# Patient Record
Sex: MALE | Race: WHITE | Employment: FULL TIME | URBAN - METROPOLITAN AREA
[De-identification: names, ages, dates, MRNs, and addresses within clinical notes are randomized per-mention and may not be internally consistent; named-entity substitution may affect disease eponyms.]

---

## 2019-04-30 ENCOUNTER — HOSPITAL ENCOUNTER (EMERGENCY)
Age: 19
Discharge: ANOTHER HOSPITAL | End: 2019-04-30
Attending: EMERGENCY MEDICINE
Payer: COMMERCIAL

## 2019-04-30 VITALS
HEART RATE: 61 BPM | TEMPERATURE: 97.6 F | BODY MASS INDEX: 22.9 KG/M2 | SYSTOLIC BLOOD PRESSURE: 130 MMHG | RESPIRATION RATE: 16 BRPM | WEIGHT: 160 LBS | DIASTOLIC BLOOD PRESSURE: 68 MMHG | HEIGHT: 70 IN | OXYGEN SATURATION: 95 %

## 2019-04-30 DIAGNOSIS — T50.901A ACCIDENTAL DRUG OVERDOSE, INITIAL ENCOUNTER: Primary | ICD-10-CM

## 2019-04-30 PROCEDURE — 99284 EMERGENCY DEPT VISIT MOD MDM: CPT

## 2019-05-01 NOTE — ED NOTES
Bed: ED-25  Expected date:   Expected time:   Means of arrival:   Comments:  [facility] 1- 19 M overdose from [address].  Angela Mancuso  04/30/19 0483

## 2019-05-01 NOTE — ED NOTES
Pt denies any drug use and states he has history of seizures . Pt is calm and cooperative .      Jak Cardoso RN  04/30/19 2410
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
## 2019-05-02 ENCOUNTER — HOSPITAL ENCOUNTER (EMERGENCY)
Age: 19
Discharge: HOME | End: 2019-05-02
Attending: EMERGENCY MEDICINE
Payer: COMMERCIAL

## 2019-05-02 VITALS
RESPIRATION RATE: 18 BRPM | TEMPERATURE: 98.4 F | SYSTOLIC BLOOD PRESSURE: 134 MMHG | BODY MASS INDEX: 22.4 KG/M2 | HEIGHT: 71 IN | OXYGEN SATURATION: 97 % | WEIGHT: 160 LBS | HEART RATE: 78 BPM | DIASTOLIC BLOOD PRESSURE: 84 MMHG

## 2019-05-02 DIAGNOSIS — F48.9 MENTAL HEALTH PROBLEM: Primary | ICD-10-CM

## 2019-05-02 PROCEDURE — 99282 EMERGENCY DEPT VISIT SF MDM: CPT

## 2019-05-02 ASSESSMENT — ENCOUNTER SYMPTOMS
ALLERGIC/IMMUNOLOGIC NEGATIVE: 1
RESPIRATORY NEGATIVE: 1
EYES NEGATIVE: 1
GASTROINTESTINAL NEGATIVE: 1

## 2019-05-02 NOTE — ED NOTES
Discharge instructions reviewed. Pt verbalized understanding.        Cynthia Antunez RN  05/02/19 6040
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
## 2019-09-30 ENCOUNTER — HOSPITAL ENCOUNTER (EMERGENCY)
Age: 19
Discharge: LEFT BEFORE DISCHARGE | End: 2019-09-30
Attending: EMERGENCY MEDICINE
Payer: MEDICAID

## 2019-09-30 DIAGNOSIS — R56.9 SEIZURE (HCC): Primary | ICD-10-CM

## 2019-09-30 PROCEDURE — 99284 EMERGENCY DEPT VISIT MOD MDM: CPT

## 2019-09-30 RX ORDER — 0.9 % SODIUM CHLORIDE 0.9 %
1000 INTRAVENOUS SOLUTION INTRAVENOUS ONCE
Status: DISCONTINUED | OUTPATIENT
Start: 2019-09-30 | End: 2019-09-30 | Stop reason: HOSPADM

## 2021-04-30 ENCOUNTER — HOSPITAL ENCOUNTER (EMERGENCY)
Age: 21
Discharge: HOME | End: 2021-04-30
Attending: EMERGENCY MEDICINE
Payer: COMMERCIAL

## 2021-04-30 ENCOUNTER — APPOINTMENT (OUTPATIENT)
Dept: CT IMAGING | Age: 21
End: 2021-04-30
Payer: COMMERCIAL

## 2021-04-30 VITALS
OXYGEN SATURATION: 100 % | DIASTOLIC BLOOD PRESSURE: 63 MMHG | RESPIRATION RATE: 16 BRPM | HEART RATE: 80 BPM | TEMPERATURE: 98 F | WEIGHT: 160 LBS | BODY MASS INDEX: 22.4 KG/M2 | HEIGHT: 71 IN | SYSTOLIC BLOOD PRESSURE: 126 MMHG

## 2021-04-30 DIAGNOSIS — G40.919 BREAKTHROUGH SEIZURE (HCC): Primary | ICD-10-CM

## 2021-04-30 DIAGNOSIS — S01.81XA FOREHEAD LACERATION, INITIAL ENCOUNTER: ICD-10-CM

## 2021-04-30 DIAGNOSIS — S16.1XXA ACUTE STRAIN OF NECK MUSCLE, INITIAL ENCOUNTER: ICD-10-CM

## 2021-04-30 DIAGNOSIS — Z91.14 NONCOMPLIANCE WITH MEDICATIONS: ICD-10-CM

## 2021-04-30 DIAGNOSIS — S01.411A CHEEK LACERATION, RIGHT, INITIAL ENCOUNTER: ICD-10-CM

## 2021-04-30 DIAGNOSIS — S13.9XXA NECK SPRAIN, INITIAL ENCOUNTER: ICD-10-CM

## 2021-04-30 DIAGNOSIS — G40.909 SEIZURE DISORDER (HCC): ICD-10-CM

## 2021-04-30 DIAGNOSIS — S09.90XA CLOSED HEAD INJURY, INITIAL ENCOUNTER: ICD-10-CM

## 2021-04-30 LAB
ALBUMIN SERPL-MCNC: 4.2 GM/DL (ref 3.4–5)
ALCOHOL SCREEN SERUM: <0.01 %WT/VOL
ALP BLD-CCNC: 71 IU/L (ref 40–129)
ALT SERPL-CCNC: 13 U/L (ref 10–40)
AMPHETAMINES: NEGATIVE
ANION GAP SERPL CALCULATED.3IONS-SCNC: 7 MMOL/L (ref 4–16)
AST SERPL-CCNC: 19 IU/L (ref 15–37)
BACTERIA: NEGATIVE /HPF
BARBITURATE SCREEN URINE: NEGATIVE
BASOPHILS ABSOLUTE: 0.1 K/CU MM
BASOPHILS RELATIVE PERCENT: 0.6 % (ref 0–1)
BENZODIAZEPINE SCREEN, URINE: NEGATIVE
BILIRUB SERPL-MCNC: 0.3 MG/DL (ref 0–1)
BILIRUBIN URINE: NEGATIVE MG/DL
BLOOD, URINE: NEGATIVE
BUN BLDV-MCNC: 15 MG/DL (ref 6–23)
CALCIUM SERPL-MCNC: 9.2 MG/DL (ref 8.3–10.6)
CANNABINOID SCREEN URINE: ABNORMAL
CHLORIDE BLD-SCNC: 102 MMOL/L (ref 99–110)
CLARITY: CLEAR
CO2: 25 MMOL/L (ref 21–32)
COCAINE METABOLITE: ABNORMAL
COLOR: YELLOW
CREAT SERPL-MCNC: 1 MG/DL (ref 0.9–1.3)
DIFFERENTIAL TYPE: ABNORMAL
EOSINOPHILS ABSOLUTE: 0.1 K/CU MM
EOSINOPHILS RELATIVE PERCENT: 1 % (ref 0–3)
GFR AFRICAN AMERICAN: >60 ML/MIN/1.73M2
GFR NON-AFRICAN AMERICAN: >60 ML/MIN/1.73M2
GLUCOSE BLD-MCNC: 126 MG/DL (ref 70–99)
GLUCOSE, URINE: NEGATIVE MG/DL
HCT VFR BLD CALC: 44.7 % (ref 42–52)
HEMOGLOBIN: 15.6 GM/DL (ref 13.5–18)
IMMATURE NEUTROPHIL %: 0.5 % (ref 0–0.43)
KETONES, URINE: NEGATIVE MG/DL
LEUKOCYTE ESTERASE, URINE: NEGATIVE
LYMPHOCYTES ABSOLUTE: 1.9 K/CU MM
LYMPHOCYTES RELATIVE PERCENT: 13.3 % (ref 24–44)
MAGNESIUM: 2.2 MG/DL (ref 1.8–2.4)
MCH RBC QN AUTO: 33.2 PG (ref 27–31)
MCHC RBC AUTO-ENTMCNC: 34.9 % (ref 32–36)
MCV RBC AUTO: 95.1 FL (ref 78–100)
MONOCYTES ABSOLUTE: 0.8 K/CU MM
MONOCYTES RELATIVE PERCENT: 5.4 % (ref 0–4)
MUCUS: ABNORMAL HPF
NITRITE URINE, QUANTITATIVE: NEGATIVE
NON SQUAM EPI CELLS: <1 /HPF
NUCLEATED RBC %: 0 %
OPIATES, URINE: NEGATIVE
OXYCODONE: NEGATIVE
PDW BLD-RTO: 11.8 % (ref 11.7–14.9)
PH, URINE: 6 (ref 5–8)
PHENCYCLIDINE, URINE: NEGATIVE
PLATELET # BLD: 243 K/CU MM (ref 140–440)
PMV BLD AUTO: 8.5 FL (ref 7.5–11.1)
POTASSIUM SERPL-SCNC: 4.1 MMOL/L (ref 3.5–5.1)
PROTEIN UA: 30 MG/DL
RBC # BLD: 4.7 M/CU MM (ref 4.6–6.2)
RBC URINE: 1 /HPF (ref 0–3)
SEGMENTED NEUTROPHILS ABSOLUTE COUNT: 11.4 K/CU MM
SEGMENTED NEUTROPHILS RELATIVE PERCENT: 79.2 % (ref 36–66)
SODIUM BLD-SCNC: 134 MMOL/L (ref 135–145)
SPECIFIC GRAVITY UA: 1.03 (ref 1–1.03)
SQUAMOUS EPITHELIAL: <1 /HPF
TOTAL IMMATURE NEUTOROPHIL: 0.07 K/CU MM
TOTAL NUCLEATED RBC: 0 K/CU MM
TOTAL PROTEIN: 6.8 GM/DL (ref 6.4–8.2)
TRICHOMONAS: ABNORMAL /HPF
UNCLASSIFIED CAST: 3 /LPF
UROBILINOGEN, URINE: NEGATIVE MG/DL (ref 0.2–1)
WBC # BLD: 14.3 K/CU MM (ref 4–10.5)
WBC UA: 1 /HPF (ref 0–2)

## 2021-04-30 PROCEDURE — 85025 COMPLETE CBC W/AUTO DIFF WBC: CPT

## 2021-04-30 PROCEDURE — 96365 THER/PROPH/DIAG IV INF INIT: CPT

## 2021-04-30 PROCEDURE — 83735 ASSAY OF MAGNESIUM: CPT

## 2021-04-30 PROCEDURE — 80177 DRUG SCRN QUAN LEVETIRACETAM: CPT

## 2021-04-30 PROCEDURE — 12013 RPR F/E/E/N/L/M 2.6-5.0 CM: CPT

## 2021-04-30 PROCEDURE — 70486 CT MAXILLOFACIAL W/O DYE: CPT

## 2021-04-30 PROCEDURE — 80053 COMPREHEN METABOLIC PANEL: CPT

## 2021-04-30 PROCEDURE — 81001 URINALYSIS AUTO W/SCOPE: CPT

## 2021-04-30 PROCEDURE — 72125 CT NECK SPINE W/O DYE: CPT

## 2021-04-30 PROCEDURE — 70450 CT HEAD/BRAIN W/O DYE: CPT

## 2021-04-30 PROCEDURE — 2500000003 HC RX 250 WO HCPCS: Performed by: PHYSICIAN ASSISTANT

## 2021-04-30 PROCEDURE — 80307 DRUG TEST PRSMV CHEM ANLYZR: CPT

## 2021-04-30 PROCEDURE — 2580000003 HC RX 258: Performed by: EMERGENCY MEDICINE

## 2021-04-30 PROCEDURE — G0480 DRUG TEST DEF 1-7 CLASSES: HCPCS

## 2021-04-30 PROCEDURE — 6370000000 HC RX 637 (ALT 250 FOR IP): Performed by: EMERGENCY MEDICINE

## 2021-04-30 PROCEDURE — 6360000002 HC RX W HCPCS: Performed by: EMERGENCY MEDICINE

## 2021-04-30 PROCEDURE — 6370000000 HC RX 637 (ALT 250 FOR IP): Performed by: PHYSICIAN ASSISTANT

## 2021-04-30 PROCEDURE — 99285 EMERGENCY DEPT VISIT HI MDM: CPT

## 2021-04-30 RX ORDER — LEVETIRACETAM 500 MG/1
500 TABLET ORAL 2 TIMES DAILY
COMMUNITY
End: 2021-04-30 | Stop reason: SDUPTHER

## 2021-04-30 RX ORDER — LACOSAMIDE 150 MG/1
150 TABLET ORAL 2 TIMES DAILY
COMMUNITY
Start: 2021-02-20 | End: 2021-04-30 | Stop reason: SDUPTHER

## 2021-04-30 RX ORDER — LACOSAMIDE 50 MG/1
150 TABLET ORAL 2 TIMES DAILY
Status: DISCONTINUED | OUTPATIENT
Start: 2021-04-30 | End: 2021-04-30 | Stop reason: HOSPADM

## 2021-04-30 RX ORDER — LEVETIRACETAM 500 MG/1
500 TABLET ORAL 2 TIMES DAILY
Qty: 60 TABLET | Refills: 0 | Status: SHIPPED | OUTPATIENT
Start: 2021-04-30 | End: 2022-03-11

## 2021-04-30 RX ORDER — DIAPER,BRIEF,INFANT-TODD,DISP
EACH MISCELLANEOUS ONCE
Status: COMPLETED | OUTPATIENT
Start: 2021-04-30 | End: 2021-04-30

## 2021-04-30 RX ORDER — LACOSAMIDE 150 MG/1
150 TABLET ORAL 2 TIMES DAILY
Qty: 60 TABLET | Refills: 0 | Status: SHIPPED | OUTPATIENT
Start: 2021-04-30 | End: 2021-07-23 | Stop reason: ALTCHOICE

## 2021-04-30 RX ORDER — BUPIVACAINE HYDROCHLORIDE 5 MG/ML
10 INJECTION, SOLUTION EPIDURAL; INTRACAUDAL ONCE
Status: COMPLETED | OUTPATIENT
Start: 2021-04-30 | End: 2021-04-30

## 2021-04-30 RX ADMIN — LEVETIRACETAM 1000 MG: 100 INJECTION, SOLUTION INTRAVENOUS at 08:03

## 2021-04-30 RX ADMIN — Medication 3 ML: at 08:03

## 2021-04-30 RX ADMIN — BUPIVACAINE HYDROCHLORIDE 50 MG: 5 INJECTION, SOLUTION EPIDURAL; INTRACAUDAL at 08:04

## 2021-04-30 RX ADMIN — LACOSAMIDE 150 MG: 50 TABLET, FILM COATED ORAL at 07:50

## 2021-04-30 RX ADMIN — BACITRACIN ZINC: 500 OINTMENT TOPICAL at 09:37

## 2021-04-30 NOTE — ED PROVIDER NOTES
Emergency Department Encounter    Patient: Taurus Hewitt  MRN: [id number]  : 2000  Date of Evaluation: 2021  ED Provider:  NAGA ROMERO      Triage Chief Complaint:   Seizures      [place]:  Taurus Hewitt is a 23 y.o. male that presents to the emergency department with fall and possible seizure. Patient reports a longstanding history of seizures for which he takes Keppra and Vimpat. Patient reports that he may have missed 2 or 3 days worth of dosages, and he is known to have seizures when he misses as little as 1 days worth. Patient recalls playing video games and then has some memory of \"freaking out\" banging on his neighbors door, and walking around the neighborhood naked. Patient reports bleeding and pain from lacerations on the forehead and right cheek. He reports some aching quality neck pain. He denies any associated weakness, numbness, or tingling. He denies any recent chest pain or discomfort, productive cough, fevers, urinary symptoms, alcohol, or other drugs. Patient sees Dr Lynder Mohs in [place] for his neurology care, but he has missed several appointments. Patient is running low on his antiepileptics, which is also contributed to his missed dosages. Patient reports that his tetanus was last updated in 2018 after he stepped on a nail. Patient reports no other particular provocative or alleviating factors. ROS - see HPI, below listed is current ROS at time of my eval:  CONSTITUTIONAL: No fevers, chills, or sweats. EYES: No vision change, redness, drainage, or discharge. HENT: No sore throat, runny nose, or earache. No dental pain. No painful swallowing. RESPIRATORY: No difficulty breathing, cough, or sputum production. CARDIOVASCULAR: No anginal-type chest pain, orthopnea, or edema. GASTROINTESTINAL: No nausea, vomiting, or abdominal pain. No diarrhea or constipation. No hematemesis, hematochezia, or melena. GENITOURINARY: No frequency, urgency, or dysuria.   No hematuria. MUSCULOSKELETAL: As above. NEUROLOGICAL: No focal weakness, numbness, or tingling. SKIN: No rashes or other lesions reported. No yellowing of the skin. Past Medical History:   Diagnosis Date    Depression     Seizures ([facility] [address]     Suicidal intent      No past surgical history on file. No family history on file. Social History     Socioeconomic History    Marital status: Single     Spouse name: Not on file    Number of children: Not on file    Years of education: Not on file    Highest education level: Not on file   Occupational History    Not on file   Social Needs    Financial resource strain: Not on file    Food insecurity     Worry: Not on file     Inability: Not on file    Transportation needs     Medical: Not on file     Non-medical: Not on file   Tobacco Use    Smoking status: Current Some Day Smoker    Smokeless tobacco: Never Used   Substance and Sexual Activity    Alcohol use:  Yes    Drug use: Never     Types: Marijuana    Sexual activity: Not on file   Lifestyle    Physical activity     Days per week: Not on file     Minutes per session: Not on file    Stress: Not on file   Relationships    Social connections     Talks on phone: Not on file     Gets together: Not on file     Attends Spiritism service: Not on file     Active member of club or organization: Not on file     Attends meetings of clubs or organizations: Not on file     Relationship status: Not on file    Intimate partner violence     Fear of current or ex partner: Not on file     Emotionally abused: Not on file     Physically abused: Not on file     Forced sexual activity: Not on file   Other Topics Concern    Not on file   Social History Narrative    ** Merged History Encounter **          Current Facility-Administered Medications   Medication Dose Route Frequency Provider Last Rate Last Admin    lacosamide (VIMPAT) tablet 150 mg  150 mg Oral BID Cassy Corbin MD   150 mg at 04/30/21 0750     Current Outpatient Medications   Medication Sig Dispense Refill    lacosamide (VIMPAT) 150 MG TABS tablet Take 1 tablet by mouth 2 times daily for 30 days. 60 tablet 0    levETIRAcetam (KEPPRA) 500 MG tablet Take 1 tablet by mouth 2 times daily 60 tablet 0     Allergies   Allergen Reactions    Oxycodone        Nursing Notes Reviewed    Physical Exam:  Triage VS:    ED Triage Vitals [04/30/21 0715]   Enc Vitals Group      /85      Pulse 76      Resp 21      Temp 97.6 °F (36.4 °C)      Temp Source Oral      SpO2 100 %      Weight 160 lb (72.6 kg)      Height 5' 11\" (1.803 m)      Head Circumference       Peak Flow       Pain Score       Pain Loc       Pain Edu? Excl. in [address]? My pulse ox interpretation is -normal on room air    GENERAL: Patient is awake, alert, and oriented appropriately. Patient is resting comfortably in a still position on the exam table. Patient speaking in full and complete sentences. Well-nourished and well-developed. HEENT: Approximate 1 cm nongaping vertical laceration in the mid forehead. No associated hematoma, step-off, or deformity. Approximate 3 cm laceration in the mid right cheek. Visible subcutaneous tissues without muscular or bony involvement detectable. No step-off or crepitus. Otherwise, no midface, orbital ridge, zygomatic, maxillary, or mandibular tenderness. No dental malocclusion. Frenulum is intact. Pupils equal, round, and reactive to light. No hyphema. No redness or matting. Bilateral external ears are unremarkable. Tympanic membranes are pearly and gray without visible effusion or retraction. Nasal mucosa is pink without purulence. Oral mucosa is moist and pink. There is no significant tonsillar enlargement or exudate. Uvula midline. There is no elevation of the tongue or pooling of secretions. NECK: Supple with normal range of motion. No Kernig's or Brudzinski signs. No visible JVD.   No significant lymphadenopathy or limitation range of motion. Mild midline and bilateral tenderness of the low cervical spine, approximately C4-T1. No associated crepitus, step-off, or deformity. RESPIRATORY: Symmetric aeration bilaterally. No audible wheezes, rales, rhonchi, or stridor. No chest wall tenderness. CARDIOVASCULAR: Regular rate and rhythm. No audible murmurs, rubs, or gallops. No central or peripheral cyanosis. GASTROINTESTINAL: Soft, nontender, and nondistended. No McBurney's or Hawkins's point tenderness. No guarding, rebound, rigidity. No mass or pulsatile mass. Bowel sounds are present in all quadrants. No costovertebral angle tenderness. NEUROLOGICAL: Awake, alert and oriented x 3. Cranial nerves III through XII are grossly intact as tested without facial droop or dermatomal paresthesias. Of note, forehead wrinkles are symmetric and intact. Conjugate gaze without entrapment. No asymmetry of the corners of the mouth or nasolabial folds. No gross motor or cerebellar deficits. Motor exhibits 5/5 strength in the bilateral trapezius, biceps, triceps, handgrip, quadriceps, psoas, dorsiflexors, and plantar flexors. No gross dermatomal paresthesias. No gross deficits of the cerebellum. MUSCULOSKELETAL: No asymmetric edema, Homans' sign, or cords. No tenderness or limitation range of motion to the bilateral shoulders, elbows, wrists, hips, knees, or ankles. No accompanying long bone tenderness or deformity. SKIN: Normal tone for ethnicity. Normal turgor and brisk capillary refill peripherally. No petechiae, purpura, vesicles, bullae, or other lesions. No icterus. PSYCHIATRIC: Normal mood. Normal affect. No voiced suicidal or homicidal ideation. Patient does not respond to internal stimuli. Emergency department course. Patient is brought to bed trauma-4 and assessed and reassessed by me.   Prior to initial evaluation, orders are placed for medical screening studies including CBC, metabolic panel, urinalysis and drug screen, and levels for Vimpat and levetiracetam.  After initial evaluation, orders are placed for CT scans of the head, maxillofacial structures, and cervical spine. Patient will be given levetiracetam 1 g IV to supplement his levels. Routine dosage of Vimpat 150 mg is provided, as well. Tetanus is appropriately up-to-date. Wound care will be provided by MAXIM Stout PA-C. Patient is agreeable to continuing plan. Upon most recent reevaluation, patient has been resting comfortably. There has been no witnessed seizure activity. There has been no indication of status epilepticus or an inappropriate postictal phase. Medical screening studies are generally reassuring. Antiepileptic levels are pending, but additional dosages have been provided in the emergency department as a precaution. CT imaging shows no apparent fracture, intracranial hemorrhage, mass-effect, or other acute change. There is some nonspecific and likely chronic sinus inflammation. He has no acute symptoms to suggest need for antibiotics or in-hospital management. Patient reports that he is uncertain if his existing neurologist will still see him as he did miss an appointment or 2. He is encouraged to call their office in the hopes that he can still maintain care. Information for to local neurologist is provided as additional resources. Prescriptions for the next month of his 2 antiepileptics is provided, as well. Head injury and wound care instructions are provided. Seizure precautions are provided. We have discussed all available results. Patient is satisfied with evaluation and agreeable to recommendations. Patient has had the opportunity to ask questions, and they have been answered to the best of my ability. Instructions are given to follow-up with primary care provider for reevaluation and further testing. Very strict return and follow-up instructions are provided.       Patient seen during [facility] I did <0.01 <0.01 %WT/VOL   Urinalysis, reflex to microscopic   Result Value Ref Range    Color, UA YELLOW YELLOW    Clarity, UA CLEAR CLEAR    Glucose, Urine NEGATIVE NEGATIVE MG/DL    Bilirubin Urine NEGATIVE NEGATIVE MG/DL    Ketones, Urine NEGATIVE NEGATIVE MG/DL    Specific Gravity, UA 1.026 1.001 - 1.035    Blood, Urine NEGATIVE NEGATIVE    pH, Urine 6.0 5.0 - 8.0    Protein, UA 30 (A) NEGATIVE MG/DL    Urobilinogen, Urine NEGATIVE 0.2 - 1.0 MG/DL    Nitrite Urine, Quantitative NEGATIVE NEGATIVE    Leukocyte Esterase, Urine NEGATIVE NEGATIVE    RBC, UA 1 0 - 3 /HPF    WBC, UA 1 0 - 2 /HPF    Bacteria, UA NEGATIVE NEGATIVE /HPF    Squam Epithel, UA <1 /HPF    Mucus, UA RARE (A) NEGATIVE HPF    Trichomonas, UA NONE SEEN NONE SEEN /HPF    non squam epi cells <1 /HPF    Unclassified Cast 3 /LPF   Drug screen multi urine   Result Value Ref Range    Cannabinoid Scrn, Ur UNCONFIRMED POSITIVE (A) NEGATIVE    Amphetamines NEGATIVE NEGATIVE    Cocaine Metabolite UNCONFIRMED POSITIVE (A) NEGATIVE    Benzodiazepine Screen, Urine NEGATIVE NEGATIVE    Barbiturate Screen, Ur NEGATIVE NEGATIVE    Opiates, Urine NEGATIVE NEGATIVE    Phencyclidine, Urine NEGATIVE NEGATIVE    Oxycodone NEGATIVE NEGATIVE        Radiographs (if obtained):  Radiologist's Report Reviewed:  Ct Head Wo Contrast    Result Date: 4/30/2021  EXAMINATION: CT OF THE HEAD WITHOUT CONTRAST; CT OF THE CERVICAL SPINE WITHOUT CONTRAST; CT OF THE FACE WITHOUT CONTRAST  4/30/2021 8:13 am TECHNIQUE: CT of the head was performed without the administration of intravenous contrast. Dose modulation, iterative reconstruction, and/or weight based adjustment of the mA/kV was utilized to reduce the radiation dose to as low as reasonably achievable.; CT of the cervical spine was performed without the administration of intravenous contrast. Multiplanar reformatted images are provided for review.  Dose modulation, iterative reconstruction, and/or weight based adjustment of the mA/kV was utilized to reduce the radiation dose to as low as reasonably achievable.; CT of the face was performed without the administration of intravenous contrast. Multiplanar reformatted images are provided for review. Dose modulation, iterative reconstruction, and/or weight based adjustment of the mA/kV was utilized to reduce the radiation dose to as low as reasonably achievable. COMPARISON: None. HISTORY: ORDERING SYSTEM PROVIDED HISTORY: Seizure, head injury TECHNOLOGIST PROVIDED HISTORY: Reason for exam:->Seizure, head injury Has a \"code stroke\" or \"stroke alert\" been called? ->No Decision Support Exception - unselect if not a suspected or confirmed emergency medical condition->Emergency Medical Condition (MA) Reason for Exam: Seizure, head injury Acuity: Acute Type of Exam: Initial FINDINGS: BRAIN/VENTRICLES: There is no acute intracranial hemorrhage, mass effect or midline shift. No abnormal extra-axial fluid collection. The gray-white differentiation is maintained without evidence of an acute infarct. There is no evidence of hydrocephalus. SOFT TISSUES/SKULL:  No acute abnormality of the visualized skull or soft tissues. CT cervical spine: No acute fracture dislocation or focal soft tissue abnormality is seen. CT scan of the maxillofacial bones: The nasal bones and zygomatic arches are intact. The mandible is normally located and intact. The orbital floors are intact. The paranasal sinuses demonstrate bilateral maxillary sinus disease and mucosal thickening in the ethmoid air cells. There is a large gaby bullosa in the right middle nasal turbinates with debris and mucosal thickening. There is a small gaby bullosa in the left middle nasal turbinates. There are soft tissue abrasion over the right cheek. No acute intracranial abnormality. No acute osseous abnormality in the cervical spine.  Bilateral sinus disease with bilateral gaby bullosa with a large gaby bullosa in the right middle nasal turbinates containing debris and mucosal thickening. No acute osseous abnormality in the maxillofacial bones     Ct Cervical Spine Wo Contrast    Result Date: 4/30/2021  EXAMINATION: CT OF THE HEAD WITHOUT CONTRAST; CT OF THE CERVICAL SPINE WITHOUT CONTRAST; CT OF THE FACE WITHOUT CONTRAST  4/30/2021 8:13 am TECHNIQUE: CT of the head was performed without the administration of intravenous contrast. Dose modulation, iterative reconstruction, and/or weight based adjustment of the mA/kV was utilized to reduce the radiation dose to as low as reasonably achievable.; CT of the cervical spine was performed without the administration of intravenous contrast. Multiplanar reformatted images are provided for review. Dose modulation, iterative reconstruction, and/or weight based adjustment of the mA/kV was utilized to reduce the radiation dose to as low as reasonably achievable.; CT of the face was performed without the administration of intravenous contrast. Multiplanar reformatted images are provided for review. Dose modulation, iterative reconstruction, and/or weight based adjustment of the mA/kV was utilized to reduce the radiation dose to as low as reasonably achievable. COMPARISON: None. HISTORY: ORDERING SYSTEM PROVIDED HISTORY: Seizure, head injury TECHNOLOGIST PROVIDED HISTORY: Reason for exam:->Seizure, head injury Has a \"code stroke\" or \"stroke alert\" been called? ->No Decision Support Exception - unselect if not a suspected or confirmed emergency medical condition->Emergency Medical Condition (MA) Reason for Exam: Seizure, head injury Acuity: Acute Type of Exam: Initial FINDINGS: BRAIN/VENTRICLES: There is no acute intracranial hemorrhage, mass effect or midline shift. No abnormal extra-axial fluid collection. The gray-white differentiation is maintained without evidence of an acute infarct. There is no evidence of hydrocephalus. SOFT TISSUES/SKULL:  No acute abnormality of the visualized skull or soft tissues. CT cervical spine: No acute fracture dislocation or focal soft tissue abnormality is seen. CT scan of the maxillofacial bones: The nasal bones and zygomatic arches are intact. The mandible is normally located and intact. The orbital floors are intact. The paranasal sinuses demonstrate bilateral maxillary sinus disease and mucosal thickening in the ethmoid air cells. There is a large gaby bullosa in the right middle nasal turbinates with debris and mucosal thickening. There is a small gaby bullosa in the left middle nasal turbinates. There are soft tissue abrasion over the right cheek. No acute intracranial abnormality. No acute osseous abnormality in the cervical spine. Bilateral sinus disease with bilateral gaby bullosa with a large gaby bullosa in the right middle nasal turbinates containing debris and mucosal thickening. No acute osseous abnormality in the maxillofacial bones     Ct Maxillofacial Wo Contrast    Result Date: 4/30/2021  EXAMINATION: CT OF THE HEAD WITHOUT CONTRAST; CT OF THE CERVICAL SPINE WITHOUT CONTRAST; CT OF THE FACE WITHOUT CONTRAST  4/30/2021 8:13 am TECHNIQUE: CT of the head was performed without the administration of intravenous contrast. Dose modulation, iterative reconstruction, and/or weight based adjustment of the mA/kV was utilized to reduce the radiation dose to as low as reasonably achievable.; CT of the cervical spine was performed without the administration of intravenous contrast. Multiplanar reformatted images are provided for review. Dose modulation, iterative reconstruction, and/or weight based adjustment of the mA/kV was utilized to reduce the radiation dose to as low as reasonably achievable.; CT of the face was performed without the administration of intravenous contrast. Multiplanar reformatted images are provided for review.  Dose modulation, iterative reconstruction, and/or weight based adjustment of the mA/kV was utilized to reduce the radiation dose to as low as reasonably achievable. COMPARISON: None. HISTORY: ORDERING SYSTEM PROVIDED HISTORY: Seizure, head injury TECHNOLOGIST PROVIDED HISTORY: Reason for exam:->Seizure, head injury Has a \"code stroke\" or \"stroke alert\" been called? ->No Decision Support Exception - unselect if not a suspected or confirmed emergency medical condition->Emergency Medical Condition (MA) Reason for Exam: Seizure, head injury Acuity: Acute Type of Exam: Initial FINDINGS: BRAIN/VENTRICLES: There is no acute intracranial hemorrhage, mass effect or midline shift. No abnormal extra-axial fluid collection. The gray-white differentiation is maintained without evidence of an acute infarct. There is no evidence of hydrocephalus. SOFT TISSUES/SKULL:  No acute abnormality of the visualized skull or soft tissues. CT cervical spine: No acute fracture dislocation or focal soft tissue abnormality is seen. CT scan of the maxillofacial bones: The nasal bones and zygomatic arches are intact. The mandible is normally located and intact. The orbital floors are intact. The paranasal sinuses demonstrate bilateral maxillary sinus disease and mucosal thickening in the ethmoid air cells. There is a large gaby bullosa in the right middle nasal turbinates with debris and mucosal thickening. There is a small gaby bullosa in the left middle nasal turbinates. There are soft tissue abrasion over the right cheek. No acute intracranial abnormality. No acute osseous abnormality in the cervical spine. Bilateral sinus disease with bilateral gaby bullosa with a large gaby bullosa in the right middle nasal turbinates containing debris and mucosal thickening. No acute osseous abnormality in the maxillofacial bones . Medical decision making:  Patient presents to the emergency department following a fall likely associated with a breakthrough seizure. Patient reports that he has not taken his antiepileptics for several days.   Patient has several lacerations to the face, but there is no indication of fracture or malalignment. There is no evidence of intracranial hemorrhage, ischemia, mass-effect, or detectable fracture. There is some mild cervical spine tenderness consistent with sprain and strain, but there is no evidence on imaging or neurological exam of fracture, light malalignment, or acute nerve impingement. Patient reports no recent upper respiratory symptoms, productive cough, fevers, chest pain or discomfort, abdominal pain, or other symptoms to suggest an underlying medical emergency. Abdomen is nonsurgical.  Patient appears generally well hydrated and nontoxic. Prescriptions will be provided to help bridge his antielliptic care, and he will provided additional follow-up for local primary care and neurology care. Procedures: Wound care provided by MAXIM Stout PA-C per his documentation. Consultations: None. Clinical Impression:  1. Breakthrough seizure ([address]    2. Closed head injury, initial encounter    3. Forehead laceration, initial encounter    4. Cheek laceration, right, initial encounter    5. Seizure disorder ([address]    6. Noncompliance with medications    7. Neck sprain, initial encounter    8. Acute strain of neck muscle, initial encounter      Disposition referral (if applicable):  Bradly Abebe MD  [address]  [facility] [address]  [phone]    Schedule an appointment as soon as possible for a visit   For primary care follow-up    Nannette Lloyd MD  [address] [facility] [id number]      For possible outpatient neurology care    Alysha Shirley DO  [address]  [facility] [address]  [phone]    Schedule an appointment as soon as possible for a visit   For possible outpatient neurology care    [facility] Emergency Department  [facility] [id number] [phone]  Go to   As needed, If symptoms worsen    Disposition medications (if applicable):  Current Discharge Medication List        ED Provider Disposition Time  DISPOSITION Decision To Discharge 04/30/2021 09:39:47 AM      Comment: Please note this report has been produced using speech recognition software and may contain errors related to that system including errors in grammar, punctuation, and spelling, as well as words and phrases that may be inappropriate. Efforts were made to edit the dictations.         Kristie Subramanian MD  04/30/21 8751

## 2021-04-30 NOTE — ED NOTES
Pt cleaned up prior to orders placed. Pt denies pain currently. Large 4 inch laceration to right cheek, bleeding controlled. Lido jel placed prior to leaving for CT scan.       Fabiana Manrique RN  04/30/21 8689

## 2021-04-30 NOTE — ED TRIAGE NOTES
Pt brought in via EMS. Pt suspects he had a seizure and fell down a flight of stairs at his apartment complex. Pt states he hasn't been taking his seizure meds. EMS states they found his disoriented but stable. Pt A&O X4 on arrival.    Pt has multiple facial lacerations.

## 2021-04-30 NOTE — ED PROVIDER NOTES
________________________________________________________________________       Procedure Note - TRUE PHILIP PA-C      Laceration Repair Procedure Note    Indication: Skin Laceration-2.5 cm laceration to the right cheek    Procedure:   - Procedure explained, including risks and benefits explained to the patient who expressed understanding. All questions were answered. Verbal consent obtained. - The Wound was prepped and draped in the usual sterile fashion using Betadine and sterile saline.  - The wound is anesthetized using LET gel and 0.5% marcaine wo epi  - Wound was explored to it's depth,  no compromise of neurovascular structures, no foreign bodies. - Wound was irrigated with copious amounts of sterile saline and mechanically debrided utilizing sterile gauze. - The laceration was Closed with 4-0 ethilon sutures, total number of 7,  simple interrupted  - Hemostasis and good cosmesis was achieved. Blood loss minimal.  - Patient tolerated procedure well without complications. Total repaired wound length: 2.5 cm    Discussed with Pt (and/or family member) at bedside today:  I discussed possibility of infection, retained foreign body, tendon injury, nerve injury. Wound care and scar minimization education was provided. Instructions were given to return for increasing pain, redness, streaking, discharge, or any other worsening or worrisome concerns. Wound check in 48 hours. Suture/Staple removal in 7-10 days. ________________________________________________________________________    My participation in patient encounter was procedure only. Please see physician note for complete patient encounter and disposition.       Bharathi Marcos [address], PA  04/30/21 [id number]

## 2021-05-02 LAB — KEPPRA: <2 UG/ML (ref 12–46)

## 2021-05-07 ENCOUNTER — OFFICE VISIT (OUTPATIENT)
Dept: FAMILY MEDICINE CLINIC | Age: 21
End: 2021-05-07
Payer: COMMERCIAL

## 2021-05-07 VITALS
SYSTOLIC BLOOD PRESSURE: 106 MMHG | OXYGEN SATURATION: 94 % | DIASTOLIC BLOOD PRESSURE: 76 MMHG | HEIGHT: 70 IN | WEIGHT: 179.2 LBS | TEMPERATURE: 98 F | HEART RATE: 75 BPM | BODY MASS INDEX: 25.65 KG/M2

## 2021-05-07 DIAGNOSIS — S01.411D LACERATION OF RIGHT CHEEK, SUBSEQUENT ENCOUNTER: ICD-10-CM

## 2021-05-07 DIAGNOSIS — G40.909 SEIZURE DISORDER (HCC): Primary | ICD-10-CM

## 2021-05-07 PROCEDURE — G8427 DOCREV CUR MEDS BY ELIG CLIN: HCPCS | Performed by: PHYSICIAN ASSISTANT

## 2021-05-07 PROCEDURE — G8419 CALC BMI OUT NRM PARAM NOF/U: HCPCS | Performed by: PHYSICIAN ASSISTANT

## 2021-05-07 PROCEDURE — 99213 OFFICE O/P EST LOW 20 MIN: CPT | Performed by: PHYSICIAN ASSISTANT

## 2021-05-07 PROCEDURE — 4004F PT TOBACCO SCREEN RCVD TLK: CPT | Performed by: PHYSICIAN ASSISTANT

## 2021-05-07 SDOH — HEALTH STABILITY: MENTAL HEALTH: HOW MANY STANDARD DRINKS CONTAINING ALCOHOL DO YOU HAVE ON A TYPICAL DAY?: 3 OR 4

## 2021-05-07 SDOH — HEALTH STABILITY: MENTAL HEALTH: HOW OFTEN DO YOU HAVE A DRINK CONTAINING ALCOHOL?: 4 OR MORE TIMES A WEEK

## 2021-05-07 NOTE — PROGRESS NOTES
Diagnosis Date    Depression     Seizures ([facility] [address]     Suicidal intent        FAMILY HISTORY  History reviewed. No pertinent family history. SOCIAL HISTORY  Social History     Socioeconomic History    Marital status: Single     Spouse name: None    Number of children: None    Years of education: None    Highest education level: None   Occupational History    None   Social Needs    Financial resource strain: None    Food insecurity     Worry: None     Inability: None    Transportation needs     Medical: None     Non-medical: None   Tobacco Use    Smoking status: Current Some Day Smoker     Packs/day: 1.00     Years: 7.00     Pack years: 7.00     Start date: 2014    Smokeless tobacco: Never Used   Substance and Sexual Activity    Alcohol use: Yes     Frequency: 4 or more times a week     Drinks per session: 3 or 4    Drug use: Yes     Types: Marijuana    Sexual activity: None   Lifestyle    Physical activity     Days per week: None     Minutes per session: None    Stress: None   Relationships    Social connections     Talks on phone: None     Gets together: None     Attends Latter day service: None     Active member of club or organization: None     Attends meetings of clubs or organizations: None     Relationship status: None    Intimate partner violence     Fear of current or ex partner: None     Emotionally abused: None     Physically abused: None     Forced sexual activity: None   Other Topics Concern    None   Social History Narrative    ** Merged History Encounter **             SURGICAL HISTORY  History reviewed. No pertinent surgical history. CURRENT MEDICATIONS  Current Outpatient Medications   Medication Sig Dispense Refill    lacosamide (VIMPAT) 150 MG TABS tablet Take 1 tablet by mouth 2 times daily for 30 days. 60 tablet 0    levETIRAcetam (KEPPRA) 500 MG tablet Take 1 tablet by mouth 2 times daily 60 tablet 0     No current facility-administered medications for this visit. Although every effort was made to ensure the accuracy of this automated transcription, some errors in transcription may have occurred.     Electronically signed by Jose Collier PA-C on 5/7/2021

## 2021-05-07 NOTE — PATIENT INSTRUCTIONS
Patient Education        Learning About Epilepsy  What is epilepsy? Epilepsy is a common condition that causes repeated seizures. Seizures may cause problems with muscle control, movement, speech, vision, or awareness. They usually don't last very long, but they can be scary. Treatment usually works to control and reduce seizures. What causes it? Many things can cause epilepsy. It may develop as a result of a head injury or a condition that causes damage to the brain, like a tumor or stroke. Genes may also play a role. But you don't have to have a family history to develop it. Often doctors don't know what causes epilepsy. What are the symptoms? The main symptom of epilepsy is repeated seizures that happen without warning. There are different kinds of seizures. You may notice strange smells or sounds. You may lose control of your muscles. Or your body may twitch or jerk. Your symptoms will depend on the type of seizure you have. How is it diagnosed? Diagnosing epilepsy can be hard. Your doctor will ask questions to find out what happened just before, during, and right after a seizure. Your doctor will examine you. You'll have some tests, such as an electroencephalogram. This information can help your doctor decide what kind of seizures you have and if you have epilepsy. How is it treated? You can take medicines to control and reduce seizures. Which type you use depends on the type of seizure. You and your doctor will need to find the right combination, schedule, and dose of medicine. If medicine alone doesn't help, your doctor may suggest a special diet or surgery to help reduce seizures. How can you care for yourself at home? To control your seizures, you need to follow your treatment plan. If you take medicine to control seizures, you must take it exactly as prescribed. The medicine works only if you take the right amount on the schedule your doctor sets up.  Following this schedule keeps the right level of medicine in your body. Even missing just a few doses can allow seizures to happen. You might be on a special ketogenic diet. If so, you'll need to follow the diet exactly for it to help prevent seizures. As you follow your treatment plan, also try to figure out and avoid things that may make you more likely to have a seizure. These may include:  · Not getting enough sleep. · Using drugs or alcohol. · Being stressed. · Skipping meals. If you keep having seizures despite treatment, keep a record of them. Note the date, time of day, and any details about the seizure that you can remember. Your doctor can use this information to plan or adjust your medicine or other treatment. The record can also help your doctor find out what kinds of seizures you are having. If you have epilepsy:  · Be sure that any doctor who treats you knows that you have epilepsy. And let the doctor know what medicines you take, if any. · Wear a medical ID bracelet. If you have a seizure or accident that leaves you unconscious or unable to speak for yourself, the bracelet will let those who are treating you know that you have epilepsy. It will also list any medicines you take to control your seizures. That way, you won't be given any medicines that will react badly with those already in your body. · Ask your doctor if it's safe for you to do things like drive or swim. · Create a seizure first-aid plan with your friends and family. The plan will help them know how to help you. The kind of plan you need can depend on the kind of seizures you have. Your doctor can tell you more about this. Follow-up care is a key part of your treatment and safety. Be sure to make and go to all appointments, and call your doctor if you are having problems. It's also a good idea to know your test results and keep a list of the medicines you take. Where can you learn more? Go to [email or web address] org and sign in to your MyChart account. Enter [phone] in the [facility] box to learn more about \"Learning About Epilepsy. \"     If you do not have an account, please click on the \"Sign Up Now\" link. Current as of: August 4, 2020               Content Version: 12.8  © 5627-8144 [employer], [employer]orporated. Care instructions adapted under license by [facility] ([facility]). If you have questions about a medical condition or this instruction, always ask your healthcare professional. [address] any warranty or liability for your use of this information.

## 2021-07-23 ENCOUNTER — OFFICE VISIT (OUTPATIENT)
Dept: FAMILY MEDICINE CLINIC | Age: 21
End: 2021-07-23
Payer: COMMERCIAL

## 2021-07-23 VITALS
WEIGHT: 172.6 LBS | BODY MASS INDEX: 24.71 KG/M2 | DIASTOLIC BLOOD PRESSURE: 86 MMHG | HEIGHT: 70 IN | OXYGEN SATURATION: 98 % | SYSTOLIC BLOOD PRESSURE: 118 MMHG | HEART RATE: 50 BPM

## 2021-07-23 DIAGNOSIS — G40.909 SEIZURE DISORDER (HCC): ICD-10-CM

## 2021-07-23 DIAGNOSIS — G40.919 BREAKTHROUGH SEIZURE (HCC): ICD-10-CM

## 2021-07-23 DIAGNOSIS — F42.2 MIXED OBSESSIONAL THOUGHTS AND ACTS: ICD-10-CM

## 2021-07-23 DIAGNOSIS — Z00.00 ENCOUNTER FOR MEDICAL EXAMINATION TO ESTABLISH CARE: Primary | ICD-10-CM

## 2021-07-23 PROCEDURE — 99215 OFFICE O/P EST HI 40 MIN: CPT | Performed by: PHYSICIAN ASSISTANT

## 2021-07-23 RX ORDER — LACOSAMIDE 150 MG/1
150 TABLET ORAL 2 TIMES DAILY
Qty: 60 TABLET | Refills: 0 | Status: SHIPPED | OUTPATIENT
Start: 2021-07-23 | End: 2021-08-25 | Stop reason: SDUPTHER

## 2021-07-23 ASSESSMENT — PATIENT HEALTH QUESTIONNAIRE - PHQ9
2. FEELING DOWN, DEPRESSED OR HOPELESS: 3
8. MOVING OR SPEAKING SO SLOWLY THAT OTHER PEOPLE COULD HAVE NOTICED. OR THE OPPOSITE, BEING SO FIGETY OR RESTLESS THAT YOU HAVE BEEN MOVING AROUND A LOT MORE THAN USUAL: 1
SUM OF ALL RESPONSES TO PHQ9 QUESTIONS 1 & 2: 1
SUM OF ALL RESPONSES TO PHQ QUESTIONS 1-9: 20
SUM OF ALL RESPONSES TO PHQ9 QUESTIONS 1 & 2: 6
2. FEELING DOWN, DEPRESSED OR HOPELESS: 1
1. LITTLE INTEREST OR PLEASURE IN DOING THINGS: 3
5. POOR APPETITE OR OVEREATING: 3
SUM OF ALL RESPONSES TO PHQ QUESTIONS 1-9: 1
1. LITTLE INTEREST OR PLEASURE IN DOING THINGS: 0
SUM OF ALL RESPONSES TO PHQ QUESTIONS 1-9: 20
4. FEELING TIRED OR HAVING LITTLE ENERGY: 3
10. IF YOU CHECKED OFF ANY PROBLEMS, HOW DIFFICULT HAVE THESE PROBLEMS MADE IT FOR YOU TO DO YOUR WORK, TAKE CARE OF THINGS AT HOME, OR GET ALONG WITH OTHER PEOPLE: 3
9. THOUGHTS THAT YOU WOULD BE BETTER OFF DEAD, OR OF HURTING YOURSELF: 0
7. TROUBLE CONCENTRATING ON THINGS, SUCH AS READING THE NEWSPAPER OR WATCHING TELEVISION: 3
SUM OF ALL RESPONSES TO PHQ QUESTIONS 1-9: 1
SUM OF ALL RESPONSES TO PHQ QUESTIONS 1-9: 20
6. FEELING BAD ABOUT YOURSELF - OR THAT YOU ARE A FAILURE OR HAVE LET YOURSELF OR YOUR FAMILY DOWN: 1
SUM OF ALL RESPONSES TO PHQ QUESTIONS 1-9: 1
3. TROUBLE FALLING OR STAYING ASLEEP: 3

## 2021-07-23 ASSESSMENT — COLUMBIA-SUICIDE SEVERITY RATING SCALE - C-SSRS
6. HAVE YOU EVER DONE ANYTHING, STARTED TO DO ANYTHING, OR PREPARED TO DO ANYTHING TO END YOUR LIFE?: NO
2. HAVE YOU ACTUALLY HAD ANY THOUGHTS OF KILLING YOURSELF?: NO
1. WITHIN THE PAST MONTH, HAVE YOU WISHED YOU WERE DEAD OR WISHED YOU COULD GO TO SLEEP AND NOT WAKE UP?: YES

## 2021-07-23 ASSESSMENT — ENCOUNTER SYMPTOMS
TROUBLE SWALLOWING: 0
CONSTIPATION: 0
SORE THROAT: 0
NAUSEA: 0
SINUS PRESSURE: 0
ABDOMINAL PAIN: 0
COUGH: 0
BLOOD IN STOOL: 0
EYE REDNESS: 0
EYE PAIN: 0
RHINORRHEA: 0
CHEST TIGHTNESS: 0
SHORTNESS OF BREATH: 0
BACK PAIN: 0
WHEEZING: 0
DIARRHEA: 0
FACIAL SWELLING: 0
VOMITING: 0

## 2021-07-23 NOTE — PROGRESS NOTES
and it caused so much strife he was forced to move out. Has obsessive worry about \"what if\"s and has a lot of stress in social situations. Pt scored a 20 on the depression screening, denies plan to kill self  Or suicidal thoughts. Has followed with [facility] and plans to reach out to them and make an appointment again. 1. Encounter for medical examination to establish care    2. Seizure disorder ([facility] [address]    3. Mixed obsessional thoughts and acts    4. Breakthrough seizure ([facility] [address]         REVIEW OF SYMPTOMS    Review of Systems   Constitutional: Negative for fatigue, fever and unexpected weight change. HENT: Negative for congestion, dental problem, facial swelling, hearing loss, rhinorrhea, sinus pressure, sore throat and trouble swallowing. Eyes: Negative for pain, redness and visual disturbance. Respiratory: Negative for cough, chest tightness, shortness of breath and wheezing. Cardiovascular: Negative for chest pain, palpitations and leg swelling. Gastrointestinal: Negative for abdominal pain, blood in stool, constipation, diarrhea, nausea and vomiting. Endocrine: Negative for cold intolerance, heat intolerance, polydipsia and polyuria. Genitourinary: Negative for dysuria, flank pain, frequency, genital sores, hematuria and urgency. Musculoskeletal: Negative for arthralgias, back pain, gait problem, joint swelling, neck pain and neck stiffness. Skin: Negative for rash. Allergic/Immunologic: Negative for environmental allergies, food allergies and immunocompromised state. Neurological: Negative for dizziness, seizures, syncope, weakness, numbness and headaches. Hematological: Negative for adenopathy. Does not bruise/bleed easily. Psychiatric/Behavioral: Positive for agitation, behavioral problems and sleep disturbance. Negative for confusion, hallucinations and suicidal ideas. The patient is nervous/anxious.         PAST MEDICAL HISTORY  Past Medical History: Diagnosis Date    Depression     Seizures ([facility] [address]     Suicidal intent        FAMILY HISTORY  No family history on file. SOCIAL HISTORY  Social History     Socioeconomic History    Marital status: Single     Spouse name: None    Number of children: None    Years of education: None    Highest education level: None   Occupational History    None   Tobacco Use    Smoking status: Current Some Day Smoker     Packs/day: 1.00     Years: 7.00     Pack years: 7.00     Start date: 2014    Smokeless tobacco: Never Used   Vaping Use    Vaping Use: Never used   Substance and Sexual Activity    Alcohol use: Yes    Drug use: Yes     Types: Marijuana    Sexual activity: None   Other Topics Concern    None   Social History Narrative    ** Merged History Encounter **          Social Determinants of Health     Financial Resource Strain:     Difficulty of Paying Living Expenses:    Food Insecurity:     Worried About Running Out of Food in the Last Year:     [address] in the Last Year:    Transportation Needs:     Lack of Transportation (Medical):  Lack of Transportation (Non-Medical):    Physical Activity:     Days of Exercise per Week:     Minutes of Exercise per Session:    Stress:     Feeling of Stress :    Social Connections:     Frequency of Communication with Friends and Family:     Frequency of Social Gatherings with Friends and Family:     Attends Latter day Services:     Active Member of Clubs or Organizations:     Attends Club or Organization Meetings:     Marital Status:    Intimate Partner Violence:     Fear of Current or Ex-Partner:     Emotionally Abused:     Physically Abused:     Sexually Abused:         SURGICAL HISTORY  No past surgical history on file.               CURRENT MEDICATIONS  Current Outpatient Medications   Medication Sig Dispense Refill    sertraline (ZOLOFT) 50 MG tablet Take 1 tablet by mouth daily If well tolerated can increase by 0.5 tablets each week up to 4 tablets daily 90 tablet 0    lacosamide (VIMPAT) 150 MG TABS tablet Take 1 tablet by mouth 2 times daily for 30 days. 60 tablet 0    levETIRAcetam (KEPPRA) 500 MG tablet Take 1 tablet by mouth 2 times daily (Patient not taking: Reported on 7/23/2021) 60 tablet 0     No current facility-administered medications for this visit. ALLERGIES  Allergies   Allergen Reactions    Oxycodone     Naproxen Rash       PHYSICAL EXAM    /86 (Site: Left Upper Arm, Position: Sitting, Cuff Size: Medium Adult)   Pulse 50   Ht 5' 9.5\" (1.765 m)   Wt 172 lb 9.6 oz (78.3 kg)   SpO2 98%   BMI 25.12 kg/m²     Physical Exam  Constitutional:       Appearance: Normal appearance. He is normal weight. HENT:      Head: Normocephalic and atraumatic. Right Ear: Tympanic membrane and external ear normal.      Left Ear: Tympanic membrane and external ear normal.      Nose: No rhinorrhea. Mouth/Throat:      Mouth: Mucous membranes are moist.      Pharynx: Oropharynx is clear. No oropharyngeal exudate or posterior oropharyngeal erythema. Eyes:      General: No scleral icterus. Extraocular Movements: Extraocular movements intact. Conjunctiva/sclera: Conjunctivae normal.      Pupils: Pupils are equal, round, and reactive to light. Cardiovascular:      Rate and Rhythm: Normal rate and regular rhythm. Pulses: Normal pulses. Heart sounds: Normal heart sounds. No murmur heard. No friction rub. No gallop. Pulmonary:      Effort: Pulmonary effort is normal.      Breath sounds: Normal breath sounds. No wheezing, rhonchi or rales. Abdominal:      General: Bowel sounds are normal. There is no distension. Palpations: Abdomen is soft. There is no mass. Tenderness: There is no abdominal tenderness. There is no right CVA tenderness, left CVA tenderness, guarding or rebound. Musculoskeletal:         General: No deformity. Normal range of motion.       Cervical back: Normal range of motion and neck supple. No rigidity. No muscular tenderness. Right lower leg: No edema. Left lower leg: No edema. Skin:     General: Skin is warm and dry. Capillary Refill: Capillary refill takes less than 2 seconds. Findings: No bruising, erythema or rash. Neurological:      General: No focal deficit present. Mental Status: He is alert and oriented to person, place, and time. Coordination: Coordination normal.      Gait: Gait normal.   Psychiatric:         Mood and Affect: Mood normal.         Behavior: Behavior normal.         ASSESSMENT & PLAN    1. Encounter for medical examination to establish care  Care gaps discussed, patient not interested in vaccinations at this time    2. Seizure disorder ([facility] [address]  Currently controlled as long as patient is compliant on Vimpat, does have an uncertain history of breakthrough seizures and should follow-up with neurology  - [facility] - Yamile Ayers DO, Neurology [place]  - lacosamide (VIMPAT) 150 MG TABS tablet; Take 1 tablet by mouth 2 times daily for 30 days. Dispense: 60 tablet; Refill: 0    3. Mixed obsessional thoughts and acts  Previous diagnosis of OCD as a child, currently uncontrolled, I do believe he would benefit from a combination of CBT and medicine. Psych referral for help with medicine and patient to follow-up with counselor that is close to his home with him he has a relationship  - sertraline (ZOLOFT) 50 MG tablet; Take 1 tablet by mouth daily If well tolerated can increase by 0.5 tablets each week up to 4 tablets daily  Dispense: 90 tablet; Refill: 0  - Ambulatory referral to Psychiatry    4. Breakthrough seizure ([facility] [address]  Usually caused by medication noncompliance, however I would like the neurologist to assess his need for a secondary medication  - lacosamide (VIMPAT) 150 MG TABS tablet; Take 1 tablet by mouth 2 times daily for 30 days. Dispense: 60 tablet; Refill: 0      Return in about 3 months (around 10/23/2021). Electronically signed by SHO Turcios on 7/23/2021      Comment: Please note this report has been produced using speech recognition software and may contain errors related to that system including errors in grammar, punctuation, and spelling, as well as words and phrases that may be inappropriate. If there are any questions or concerns please feel free to contact the dictating provider for clarification.

## 2021-08-23 ENCOUNTER — TELEPHONE (OUTPATIENT)
Dept: FAMILY MEDICINE CLINIC | Age: 21
End: 2021-08-23

## 2021-08-23 RX ORDER — HYDROXYZINE PAMOATE 25 MG/1
50 CAPSULE ORAL 2 TIMES DAILY
Qty: 84 CAPSULE | Refills: 0 | Status: SHIPPED | OUTPATIENT
Start: 2021-08-23 | End: 2021-09-13

## 2021-08-23 NOTE — TELEPHONE ENCOUNTER
PT GOT COVID. HARD TO SLEEP. PANIC ATTACK. ALSO A FEW SEIZURES. PT NEEDS TO KNOW WHAT HE NEEDS TO DO. HE IS NOT DOING WELL WITH THIS. IF SOMEONE COULD CALL HIM BACK ASAP.  [phone]

## 2021-08-23 NOTE — PROGRESS NOTES
Called and spoke with patient. Stopped taking zoloft after only a few doses due to insomnia. Last week contracted COVID. Since getting covid is having nightly anxiety attacks and is not sleeping more than an hour or so a night. Called in vistaril for pt. Encouraged to make an appointment once out of quarantine for COVID.      Pt has been on benzos for panic attacks in the past.

## 2021-08-25 ENCOUNTER — VIRTUAL VISIT (OUTPATIENT)
Dept: FAMILY MEDICINE CLINIC | Age: 21
End: 2021-08-25
Payer: COMMERCIAL

## 2021-08-25 DIAGNOSIS — G40.919 BREAKTHROUGH SEIZURE (HCC): ICD-10-CM

## 2021-08-25 DIAGNOSIS — F41.0 PANIC ATTACKS: Primary | ICD-10-CM

## 2021-08-25 DIAGNOSIS — R45.1 RESTLESSNESS AND AGITATION: ICD-10-CM

## 2021-08-25 DIAGNOSIS — G40.909 SEIZURE DISORDER (HCC): ICD-10-CM

## 2021-08-25 DIAGNOSIS — F41.9 ANXIETY: ICD-10-CM

## 2021-08-25 PROCEDURE — 99423 OL DIG E/M SVC 21+ MIN: CPT | Performed by: PHYSICIAN ASSISTANT

## 2021-08-25 RX ORDER — QUETIAPINE FUMARATE 25 MG/1
50 TABLET, FILM COATED ORAL 2 TIMES DAILY
Qty: 120 TABLET | Refills: 2 | Status: SHIPPED | OUTPATIENT
Start: 2021-08-25 | End: 2022-03-11

## 2021-08-25 RX ORDER — LACOSAMIDE 150 MG/1
150 TABLET ORAL 2 TIMES DAILY
Qty: 60 TABLET | Refills: 0 | Status: SHIPPED | OUTPATIENT
Start: 2021-08-25 | End: 2021-11-10 | Stop reason: SDUPTHER

## 2021-08-25 NOTE — PROGRESS NOTES
8/25/2021    Thomas [address]    Chief Complaint   Patient presents with    Anxiety     recently at night has been having more panic attacks, is now having them more throughout the day, has tried sitting down taking deep breathes, worries that it'll cause him to have a seizure. HPI  History was obtained from pt. Jesus Mead is a 19 y.o. male with a PMHx as listed below who presents today for concern about anxiety/panic attacks - thinks he has had them a long time but always thought they were a lead-up to a seizure. They occur more at night than the day. The Vistaril did help him sleep, but still woke up with some panic attacks. We did start Zoloft at last appointment however patient believes that it made him more agitated, hyperactive and worsened his insomnia. I wonder if this was unmasking jaret or hypomania component to his psychiatric condition. Patient says he has been on Seroquel in the past.    Claustrophobia - more recent occurrence, has to open bathroom door, has to have windows down in the car, etc. Never really had this before. Seizures - last seizure was 2 weeks ago Friday. About 6 am - pt was with Gf, she helped hold him down. Was not contacted from neurology, I provided the patient with the phone number and recommend he reach out to them. Counseling - pt wants to get in with [facility], but has been very busy lately and now is quarantining from covid. Psych referral at last visit, patient has not heard from them, recommend he reach out to them as well, phone number provided. 1. Panic attacks    2. Seizure disorder ([address]    3. Breakthrough seizure ([address]    4. Anxiety    5. Restlessness and agitation             REVIEW OF SYMPTOMS    Review of Systems    PAST MEDICAL HISTORY  Past Medical History:   Diagnosis Date    Depression     Seizures ([address]     Suicidal intent        FAMILY HISTORY  No family history on file.     SOCIAL HISTORY  Social History     Socioeconomic History    Marital status: Single     Spouse name: Not on file    Number of children: Not on file    Years of education: Not on file    Highest education level: Not on file   Occupational History    Not on file   Tobacco Use    Smoking status: Current Some Day Smoker     Packs/day: 1.00     Years: 7.00     Pack years: 7.00     Start date: 2014    Smokeless tobacco: Never Used   Vaping Use    Vaping Use: Never used   Substance and Sexual Activity    Alcohol use: Yes    Drug use: Yes     Types: Marijuana    Sexual activity: Not on file   Other Topics Concern    Not on file   Social History Narrative    ** Merged History Encounter **          Social Determinants of Health     Financial Resource Strain:     Difficulty of Paying Living Expenses:    Food Insecurity:     Worried About Running Out of Food in the Last Year:     Ran Out of Food in the Last Year:    Transportation Needs:     Lack of Transportation (Medical):  Lack of Transportation (Non-Medical):    Physical Activity:     Days of Exercise per Week:     Minutes of Exercise per Session:    Stress:     Feeling of Stress :    Social Connections:     Frequency of Communication with Friends and Family:     Frequency of Social Gatherings with Friends and Family:     Attends Adventism Services:     Active Member of Clubs or Organizations:     Attends Club or Organization Meetings:     Marital Status:    Intimate Partner Violence:     Fear of Current or Ex-Partner:     Emotionally Abused:     Physically Abused:     Sexually Abused:         SURGICAL HISTORY  No past surgical history on file. CURRENT MEDICATIONS  Current Outpatient Medications   Medication Sig Dispense Refill    lacosamide (VIMPAT) 150 MG TABS tablet Take 1 tablet by mouth 2 times daily for 30 days. 60 tablet 0    QUEtiapine (SEROQUEL) 25 MG tablet Take 2 tablets by mouth 2 times daily Begin taking 1 pill twice a day and increase to 2 pills twice a day after 1 week. 120 tablet 2    hydrOXYzine (VISTARIL) 25 MG capsule Take 2 capsules by mouth 2 times daily for 21 days Take 1-2 pills 30 mins before bedtime. If you awaken with a panic attack, take 1-2 more. 84 capsule 0    sertraline (ZOLOFT) 50 MG tablet Take 1 tablet by mouth daily If well tolerated can increase by 0.5 tablets each week up to 4 tablets daily (Patient not taking: Reported on 8/25/2021) 90 tablet 0    levETIRAcetam (KEPPRA) 500 MG tablet Take 1 tablet by mouth 2 times daily (Patient not taking: Reported on 7/23/2021) 60 tablet 0     No current facility-administered medications for this visit. ALLERGIES  Allergies   Allergen Reactions    Oxycodone     Naproxen Rash       PHYSICAL EXAM    There were no vitals taken for this visit. Physical Exam    ASSESSMENT & PLAN    1. Seizure disorder ([facility]  Patient to follow-up with neurology  - lacosamide (VIMPAT) 150 MG TABS tablet; Take 1 tablet by mouth 2 times daily for 30 days. Dispense: 60 tablet; Refill: 0    2. Breakthrough seizure ([address]    - lacosamide (VIMPAT) 150 MG TABS tablet; Take 1 tablet by mouth 2 times daily for 30 days. Dispense: 60 tablet; Refill: 0    3. Panic attacks  Restart Zoloft  Patient to follow-up with psychiatry    4. Anxiety  Restart Zoloft and add on Seroquel for agitation and hypomania-like symptoms  - QUEtiapine (SEROQUEL) 25 MG tablet; Take 2 tablets by mouth 2 times daily Begin taking 1 pill twice a day and increase to 2 pills twice a day after 1 week. Dispense: 120 tablet; Refill: 2    5. Restlessness and agitation    - QUEtiapine (SEROQUEL) 25 MG tablet; Take 2 tablets by mouth 2 times daily Begin taking 1 pill twice a day and increase to 2 pills twice a day after 1 week. Dispense: 120 tablet; Refill: 2      Return in about 4 weeks (around 9/22/2021). Electronically signed by SHO Ventura on 8/25/2021    [facility], was evaluated through a synchronous (real-time) audio-video encounter.  The patient (or guardian if applicable) is aware that this is a billable service. Verbal consent to proceed has been obtained within the past 12 months. The visit was conducted pursuant to the emergency declaration under the [address] [place] [address] authority and the "[employer] and [employer] General Act. Patient identification was verified, and a caregiver was present when appropriate. The patient was located in a state where the provider was credentialed to provide care. Total time spent for this encounter: 22min    --SHO Rebollar on 8/25/2021 at 1:58 PM    An electronic signature was used to authenticate this note. Comment: Please note this report has been produced using speech recognition software and may contain errors related to that system including errors in grammar, punctuation, and spelling, as well as words and phrases that may be inappropriate. If there are any questions or concerns please feel free to contact the dictating provider for clarification.

## 2021-08-27 ENCOUNTER — TELEPHONE (OUTPATIENT)
Dept: FAMILY MEDICINE CLINIC | Age: 21
End: 2021-08-27

## 2021-08-27 NOTE — TELEPHONE ENCOUNTER
----- Message from Katarzyna Hope sent at 8/27/2021  2:16 PM EDT -----  Subject: Message to Provider    QUESTIONS  Information for Provider? Patient said he has covid and will need a note   to go back to work saying he is negative in about a week.  ---------------------------------------------------------------------------  --------------  [address]  What is the best way for the office to contact you? OK to leave message on   voicemail  Preferred Call Back Phone Number? [phone]  ---------------------------------------------------------------------------  --------------  SCRIPT ANSWERS  Relationship to Patient?  Self

## 2021-08-30 ENCOUNTER — TELEPHONE (OUTPATIENT)
Dept: FAMILY MEDICINE CLINIC | Age: 21
End: 2021-08-30

## 2021-08-30 NOTE — TELEPHONE ENCOUNTER
PT CALLED TO SEE ABOUT THE NUMBERS HE WAS GIVEN AT HIS LAST APPT HE WAS GIVEN REFERRAL TO NEUROLOGY ALONG WITH PSYCHIATRY. PT STATES ONE OF THE NUMBERS IS NOT IN SERVICE. IF SOMEONE COULD GIVE PT A CALL.

## 2021-11-10 DIAGNOSIS — G40.909 SEIZURE DISORDER (HCC): ICD-10-CM

## 2021-11-10 DIAGNOSIS — G40.919 BREAKTHROUGH SEIZURE (HCC): ICD-10-CM

## 2021-11-10 RX ORDER — LACOSAMIDE 150 MG/1
150 TABLET ORAL 2 TIMES DAILY
Qty: 60 TABLET | Refills: 0 | Status: SHIPPED | OUTPATIENT
Start: 2021-11-10 | End: 2022-01-07 | Stop reason: SDUPTHER

## 2022-01-07 DIAGNOSIS — G40.909 SEIZURE DISORDER (HCC): ICD-10-CM

## 2022-01-07 DIAGNOSIS — G40.919 BREAKTHROUGH SEIZURE (HCC): ICD-10-CM

## 2022-01-07 RX ORDER — LACOSAMIDE 150 MG/1
150 TABLET ORAL 2 TIMES DAILY
Qty: 60 TABLET | Refills: 0 | Status: SHIPPED | OUTPATIENT
Start: 2022-01-07 | End: 2022-03-07 | Stop reason: SDUPTHER

## 2022-03-07 DIAGNOSIS — G40.909 SEIZURE DISORDER (HCC): ICD-10-CM

## 2022-03-07 DIAGNOSIS — G40.919 BREAKTHROUGH SEIZURE (HCC): ICD-10-CM

## 2022-03-08 RX ORDER — LACOSAMIDE 150 MG/1
150 TABLET ORAL 2 TIMES DAILY
Qty: 60 TABLET | Refills: 0 | Status: SHIPPED | OUTPATIENT
Start: 2022-03-08 | End: 2022-05-17 | Stop reason: SDUPTHER

## 2022-03-11 ENCOUNTER — OFFICE VISIT (OUTPATIENT)
Dept: FAMILY MEDICINE CLINIC | Age: 22
End: 2022-03-11
Payer: COMMERCIAL

## 2022-03-11 VITALS
SYSTOLIC BLOOD PRESSURE: 130 MMHG | OXYGEN SATURATION: 97 % | HEART RATE: 77 BPM | HEIGHT: 70 IN | DIASTOLIC BLOOD PRESSURE: 84 MMHG | WEIGHT: 179.8 LBS | BODY MASS INDEX: 25.74 KG/M2

## 2022-03-11 DIAGNOSIS — F41.0 PANIC ATTACKS: ICD-10-CM

## 2022-03-11 DIAGNOSIS — F51.04 PSYCHOPHYSIOLOGICAL INSOMNIA: ICD-10-CM

## 2022-03-11 DIAGNOSIS — G40.919 BREAKTHROUGH SEIZURE (HCC): ICD-10-CM

## 2022-03-11 DIAGNOSIS — F51.01 PRIMARY INSOMNIA: ICD-10-CM

## 2022-03-11 DIAGNOSIS — G40.909 SEIZURE DISORDER (HCC): ICD-10-CM

## 2022-03-11 DIAGNOSIS — R45.1 RESTLESSNESS AND AGITATION: Primary | ICD-10-CM

## 2022-03-11 PROCEDURE — 4004F PT TOBACCO SCREEN RCVD TLK: CPT | Performed by: PHYSICIAN ASSISTANT

## 2022-03-11 PROCEDURE — G8484 FLU IMMUNIZE NO ADMIN: HCPCS | Performed by: PHYSICIAN ASSISTANT

## 2022-03-11 PROCEDURE — G8427 DOCREV CUR MEDS BY ELIG CLIN: HCPCS | Performed by: PHYSICIAN ASSISTANT

## 2022-03-11 PROCEDURE — 99214 OFFICE O/P EST MOD 30 MIN: CPT | Performed by: PHYSICIAN ASSISTANT

## 2022-03-11 PROCEDURE — G8419 CALC BMI OUT NRM PARAM NOF/U: HCPCS | Performed by: PHYSICIAN ASSISTANT

## 2022-03-11 RX ORDER — LACOSAMIDE 150 MG/1
150 TABLET ORAL 2 TIMES DAILY
Qty: 60 TABLET | Refills: 0 | Status: CANCELLED | OUTPATIENT
Start: 2022-03-11 | End: 2022-04-10

## 2022-03-11 RX ORDER — TRAZODONE HYDROCHLORIDE 50 MG/1
100 TABLET ORAL NIGHTLY
Qty: 60 TABLET | Refills: 2 | Status: SHIPPED | OUTPATIENT
Start: 2022-03-11 | End: 2022-07-15 | Stop reason: SDUPTHER

## 2022-03-11 NOTE — PROGRESS NOTES
3/11/2022    [address]    Chief Complaint   Patient presents with    Medication Refill    Panic Attack     unsure if its related to his siezures, last 5-20 seconds, has a couple during the day, more freuquent at night when trying to sleep.  Insomnia     on going for about over a month, has terrible dreams when sleeping, trouble getting to sleep, has tried melatonin. HPI  History was obtained from pt. Nneka Bowers is a 19 y.o. male with a PMHx as listed below who presents today for follow up for seizure disorder and panic attacks/insomnia. Pt has stopped all other meds besides vimpat. Pt did have one seizure around the new year. He woke from sleep and his girlfriend was able to hold him down. Lasted about 15 minutes. Thinks he probably missed a dose of vimpat. But that was the last time he had one. Pt is having a very difficult time sleeping. He has trouble falling asleep due to anxiety and panic attacks and sometimes he will have very disturbing nightmares that prevent him from sleeping. The panic attacks are very brief, <30 seconds and they occur back to back sometimes. He wonders if they are auras that never become seizures. Pt has not been able to follow up on his psych visit. 1. Restlessness and agitation    2. Seizure disorder ([address]    3. Breakthrough seizure ([address]    4. Panic attacks    5. Primary insomnia    6. Psychophysiological insomnia             REVIEW OF SYMPTOMS    Review of Systems    PAST MEDICAL HISTORY  Past Medical History:   Diagnosis Date    Depression     Seizures ([address]     Suicidal intent        FAMILY HISTORY  No family history on file.     SOCIAL HISTORY  Social History     Socioeconomic History    Marital status: Single     Spouse name: Not on file    Number of children: Not on file    Years of education: Not on file    Highest education level: Not on file   Occupational History    Not on file   Tobacco Use    Smoking status: Current Some Day Smoker Packs/day: 1.00     Years: 7.00     Pack years: 7.00     Start date: 2014    Smokeless tobacco: Never Used   Vaping Use    Vaping Use: Never used   Substance and Sexual Activity    Alcohol use: Yes    Drug use: Yes     Types: Marijuana Deboraha Sanes)    Sexual activity: Not on file   Other Topics Concern    Not on file   Social History Narrative    ** Merged History Encounter **          Social Determinants of Health     Financial Resource Strain:     Difficulty of Paying Living Expenses: Not on file   Food Insecurity:     Worried About Running Out of Food in the Last Year: Not on file    Roxi of Food in the Last Year: Not on file   Transportation Needs:     Lack of Transportation (Medical): Not on file    Lack of Transportation (Non-Medical): Not on file   Physical Activity:     Days of Exercise per Week: Not on file    Minutes of Exercise per Session: Not on file   Stress:     Feeling of Stress : Not on file   Social Connections:     Frequency of Communication with Friends and Family: Not on file    Frequency of Social Gatherings with Friends and Family: Not on file    Attends Moravian Services: Not on file    Active Member of [address] [place] [address] or Organizations: Not on file    Attends Club or Organization Meetings: Not on file    Marital Status: Not on file   Intimate Partner Violence:     Fear of Current or Ex-Partner: Not on file    Emotionally Abused: Not on file    Physically Abused: Not on file    Sexually Abused: Not on file   Housing Stability:     Unable to Pay for Housing in the Last Year: Not on file    Number of [place] in the Last Year: Not on file    Unstable Housing in the Last Year: Not on file        SURGICAL HISTORY  No past surgical history on file.               CURRENT MEDICATIONS  Current Outpatient Medications   Medication Sig Dispense Refill    traZODone (DESYREL) 50 MG tablet Take 2 tablets by mouth nightly 60 tablet 2    lacosamide (VIMPAT) 150 MG TABS tablet Take 1 tablet by mouth 2 times daily for 30 days. 60 tablet 0     No current facility-administered medications for this visit. ALLERGIES  Allergies   Allergen Reactions    Naproxen Rash       PHYSICAL EXAM    /84 (Site: Left Upper Arm, Position: Sitting, Cuff Size: Medium Adult)   Pulse 77   Ht 5' 9.5\" (1.765 m)   Wt 179 lb 12.8 oz (81.6 kg)   SpO2 97%   BMI 26.17 kg/m²     Physical Exam  Constitutional:       Appearance: Normal appearance. He is normal weight. HENT:      Head: Normocephalic and atraumatic. Right Ear: Tympanic membrane and external ear normal.      Left Ear: Tympanic membrane and external ear normal.      Nose: No rhinorrhea. Mouth/Throat:      Mouth: Mucous membranes are moist.      Pharynx: Oropharynx is clear. No oropharyngeal exudate or posterior oropharyngeal erythema. Eyes:      General: No scleral icterus. Extraocular Movements: Extraocular movements intact. Conjunctiva/sclera: Conjunctivae normal.      Pupils: Pupils are equal, round, and reactive to light. Cardiovascular:      Rate and Rhythm: Normal rate and regular rhythm. Pulses: Normal pulses. Heart sounds: Normal heart sounds. No murmur heard. No friction rub. No gallop. Pulmonary:      Effort: Pulmonary effort is normal.      Breath sounds: Normal breath sounds. No wheezing, rhonchi or rales. Abdominal:      General: Bowel sounds are normal. There is no distension. Palpations: Abdomen is soft. There is no mass. Tenderness: There is no abdominal tenderness. There is no right CVA tenderness, left CVA tenderness, guarding or rebound. Musculoskeletal:         General: No deformity. Normal range of motion. Cervical back: Normal range of motion and neck supple. No rigidity. No muscular tenderness. Right lower leg: No edema. Left lower leg: No edema. Skin:     General: Skin is warm and dry. Capillary Refill: Capillary refill takes less than 2 seconds. Findings: No bruising, erythema or rash. Neurological:      General: No focal deficit present. Mental Status: He is alert and oriented to person, place, and time. Coordination: Coordination normal.      Gait: Gait normal.   Psychiatric:         Mood and Affect: Mood normal.         Behavior: Behavior normal.         ASSESSMENT & PLAN    1. Seizure disorder (HCC)  Stable on vimpat, continue    2. Breakthrough seizure ([address]  Due to missed dose, isolated and no status ep    3. Restlessness and agitation  Due to anxiety and panic disorder  - traZODone (DESYREL) 50 MG tablet; Take 2 tablets by mouth nightly  Dispense: 60 tablet; Refill: 2    4. Panic attacks  Uncontrolled and untreated currently  Pt to call psych office to set up appt  - traZODone (DESYREL) 50 MG tablet; Take 2 tablets by mouth nightly  Dispense: 60 tablet; Refill: 2    6. Psychophysiological insomnia  Due to anxiety and panic disorder  - traZODone (DESYREL) 50 MG tablet; Take 2 tablets by mouth nightly  Dispense: 60 tablet; Refill: 2      Return in about 3 months (around 6/11/2022). Electronically signed by SHO Moy on 3/11/2022      Comment: Please note this report has been produced using speech recognition software and may contain errors related to that system including errors in grammar, punctuation, and spelling, as well as words and phrases that may be inappropriate. If there are any questions or concerns please feel free to contact the dictating provider for clarification.

## 2022-05-17 DIAGNOSIS — G40.909 SEIZURE DISORDER (HCC): ICD-10-CM

## 2022-05-17 DIAGNOSIS — G40.919 BREAKTHROUGH SEIZURE (HCC): ICD-10-CM

## 2022-05-18 RX ORDER — LACOSAMIDE 150 MG/1
150 TABLET ORAL 2 TIMES DAILY
Qty: 60 TABLET | Refills: 0 | Status: SHIPPED | OUTPATIENT
Start: 2022-05-18 | End: 2022-07-15 | Stop reason: SDUPTHER

## 2022-07-15 DIAGNOSIS — G40.919 BREAKTHROUGH SEIZURE (HCC): ICD-10-CM

## 2022-07-15 DIAGNOSIS — R45.1 RESTLESSNESS AND AGITATION: ICD-10-CM

## 2022-07-15 DIAGNOSIS — F51.04 PSYCHOPHYSIOLOGICAL INSOMNIA: ICD-10-CM

## 2022-07-15 DIAGNOSIS — F41.0 PANIC ATTACKS: ICD-10-CM

## 2022-07-15 DIAGNOSIS — G40.909 SEIZURE DISORDER (HCC): ICD-10-CM

## 2022-07-15 RX ORDER — TRAZODONE HYDROCHLORIDE 50 MG/1
100 TABLET ORAL NIGHTLY
Qty: 60 TABLET | Refills: 2 | Status: SHIPPED | OUTPATIENT
Start: 2022-07-15 | End: 2022-10-07 | Stop reason: SDUPTHER

## 2022-07-15 RX ORDER — LACOSAMIDE 150 MG/1
150 TABLET ORAL 2 TIMES DAILY
Qty: 60 TABLET | Refills: 0 | Status: SHIPPED | OUTPATIENT
Start: 2022-07-15 | End: 2022-08-26

## 2022-07-19 ENCOUNTER — OFFICE VISIT (OUTPATIENT)
Dept: FAMILY MEDICINE CLINIC | Age: 22
End: 2022-07-19
Payer: COMMERCIAL

## 2022-07-19 VITALS
WEIGHT: 171 LBS | BODY MASS INDEX: 24.48 KG/M2 | OXYGEN SATURATION: 94 % | HEART RATE: 71 BPM | HEIGHT: 70 IN | DIASTOLIC BLOOD PRESSURE: 80 MMHG | SYSTOLIC BLOOD PRESSURE: 134 MMHG

## 2022-07-19 DIAGNOSIS — Z00.00 WELLNESS EXAMINATION: ICD-10-CM

## 2022-07-19 DIAGNOSIS — F41.0 PANIC ATTACKS: ICD-10-CM

## 2022-07-19 DIAGNOSIS — F41.9 ANXIETY: ICD-10-CM

## 2022-07-19 DIAGNOSIS — G40.909 SEIZURE DISORDER (HCC): Primary | ICD-10-CM

## 2022-07-19 PROCEDURE — G8420 CALC BMI NORM PARAMETERS: HCPCS | Performed by: PHYSICIAN ASSISTANT

## 2022-07-19 PROCEDURE — G8427 DOCREV CUR MEDS BY ELIG CLIN: HCPCS | Performed by: PHYSICIAN ASSISTANT

## 2022-07-19 PROCEDURE — 4004F PT TOBACCO SCREEN RCVD TLK: CPT | Performed by: PHYSICIAN ASSISTANT

## 2022-07-19 PROCEDURE — 99213 OFFICE O/P EST LOW 20 MIN: CPT | Performed by: PHYSICIAN ASSISTANT

## 2022-07-19 ASSESSMENT — PATIENT HEALTH QUESTIONNAIRE - PHQ9
1. LITTLE INTEREST OR PLEASURE IN DOING THINGS: 0
4. FEELING TIRED OR HAVING LITTLE ENERGY: 0
8. MOVING OR SPEAKING SO SLOWLY THAT OTHER PEOPLE COULD HAVE NOTICED. OR THE OPPOSITE, BEING SO FIGETY OR RESTLESS THAT YOU HAVE BEEN MOVING AROUND A LOT MORE THAN USUAL: 0
2. FEELING DOWN, DEPRESSED OR HOPELESS: 0
SUM OF ALL RESPONSES TO PHQ9 QUESTIONS 1 & 2: 0
3. TROUBLE FALLING OR STAYING ASLEEP: 0
7. TROUBLE CONCENTRATING ON THINGS, SUCH AS READING THE NEWSPAPER OR WATCHING TELEVISION: 0
6. FEELING BAD ABOUT YOURSELF - OR THAT YOU ARE A FAILURE OR HAVE LET YOURSELF OR YOUR FAMILY DOWN: 0
SUM OF ALL RESPONSES TO PHQ QUESTIONS 1-9: 0
5. POOR APPETITE OR OVEREATING: 0
10. IF YOU CHECKED OFF ANY PROBLEMS, HOW DIFFICULT HAVE THESE PROBLEMS MADE IT FOR YOU TO DO YOUR WORK, TAKE CARE OF THINGS AT HOME, OR GET ALONG WITH OTHER PEOPLE: 0
SUM OF ALL RESPONSES TO PHQ QUESTIONS 1-9: 0
9. THOUGHTS THAT YOU WOULD BE BETTER OFF DEAD, OR OF HURTING YOURSELF: 0
SUM OF ALL RESPONSES TO PHQ QUESTIONS 1-9: 0
SUM OF ALL RESPONSES TO PHQ QUESTIONS 1-9: 0

## 2022-07-19 NOTE — PROGRESS NOTES
7/19/2022    [address]    Chief Complaint   Patient presents with    3 Month Follow-Up     Pt reports no concerns       HPI  History was obtained from pt. Tanya Alvarenga is a 23 y.o. male with a PMHx as listed below who presents today for 3 month follow up. He has no concerns at this time, already got the meds refilled. Pt has not had anymore seizures, which is good. No acute concerns. 1. Seizure disorder ([address]    2. Panic attacks    3. Anxiety    4. Wellness examination         REVIEW OF SYMPTOMS    Review of Systems    PAST MEDICAL HISTORY  Past Medical History:   Diagnosis Date    Depression     Seizures ([address]     Suicidal intent        FAMILY HISTORY  No family history on file. SOCIAL HISTORY  Social History     Socioeconomic History    Marital status: Single     Spouse name: None    Number of children: None    Years of education: None    Highest education level: None   Tobacco Use    Smoking status: Some Days     Packs/day: 1.00     Years: 7.00     Pack years: 7.00     Types: Cigarettes     Start date: 2014    Smokeless tobacco: Never   Vaping Use    Vaping Use: Never used   Substance and Sexual Activity    Alcohol use: Yes    Drug use: Yes     Types: Marijuana Karolina Postin)   Social History Narrative    ** Merged History Encounter **             SURGICAL HISTORY  No past surgical history on file. CURRENT MEDICATIONS  Current Outpatient Medications   Medication Sig Dispense Refill    traZODone (DESYREL) 50 MG tablet Take 2 tablets by mouth nightly 60 tablet 2    lacosamide (VIMPAT) 150 MG TABS tablet Take 1 tablet by mouth in the morning and 1 tablet before bedtime. Do all this for 30 days. 60 tablet 0     No current facility-administered medications for this visit.        ALLERGIES  Allergies   Allergen Reactions    Naproxen Rash       PHYSICAL EXAM    /80   Pulse 71   Ht 5' 9.5\" (1.765 m)   Wt 171 lb (77.6 kg)   SpO2 94%   BMI 24.89 kg/m²     Physical Exam  Constitutional: attacks  stable    3. Anxiety  stable    Return in about 3 months (around 10/19/2022). Electronically signed by Emmanuel Closs, PA on 7/19/2022      Comment: Please note this report has been produced using speech recognition software and may contain errors related to that system including errors in grammar, punctuation, and spelling, as well as words and phrases that may be inappropriate. If there are any questions or concerns please feel free to contact the dictating provider for clarification.

## 2022-07-19 NOTE — LETTER
[address] [place] [address] Hitesh Parmar  Phone: [phone]  Fax: [address]        July 19, 2022     Patient: Rupali Mathur   YOB: 2000   Date of Visit: 7/19/2022       To Whom It May Concern: It is my medical opinion that Rupali Mathur may return to full duty immediately with no restrictions. He was seen today at our office. If you have any questions or concerns, please don't hesitate to call.     Sincerely,        Georgie Gramajo LM to r/s 5/11 appt

## 2022-08-26 ENCOUNTER — TELEMEDICINE (OUTPATIENT)
Dept: FAMILY MEDICINE CLINIC | Age: 22
End: 2022-08-26
Payer: COMMERCIAL

## 2022-08-26 DIAGNOSIS — G40.909 SEIZURE DISORDER (HCC): ICD-10-CM

## 2022-08-26 DIAGNOSIS — G40.919 BREAKTHROUGH SEIZURE (HCC): ICD-10-CM

## 2022-08-26 PROCEDURE — 99214 OFFICE O/P EST MOD 30 MIN: CPT | Performed by: PHYSICIAN ASSISTANT

## 2022-08-26 RX ORDER — LACOSAMIDE 200 MG/1
200 TABLET ORAL 2 TIMES DAILY
Qty: 60 TABLET | Refills: 0 | Status: SHIPPED | OUTPATIENT
Start: 2022-08-26 | End: 2022-10-07 | Stop reason: SDUPTHER

## 2022-08-26 NOTE — PROGRESS NOTES
Dain Castrejon is a 23 y.o. male evaluated via telephone on 8/26/2022. Consent:  He and/or health care decision maker is aware that that he may receive a bill for this telephone service, depending on his insurance coverage, and has provided verbal consent to proceed: Yes      Documentation:  I communicated with the patient and/or health care decision maker about his breakthrough seizures. He has been having them more often. He had them in July and went to the ER, and was admitted. They wanted to bump up his Vimpat to 200 mg, but the patient has not been taking the increased dose yet. Pt doesn't know exactly why he is having the breakthough seizures. He has been drinking alcohol more recently but wasn't always using when he had the seizures. Was set up to see a neurologist, but needs help with transportation. Review of Systems    1. Seizure disorder (HCC)  Increase vimpat  Decrease alcohol intake  Avoid dehydration  Make sure getting enough sleep  - lacosamide (VIMPAT) 200 MG tablet; Take 1 tablet by mouth 2 times daily for 60 days. Dispense: 60 tablet; Refill: 0    2. Breakthrough seizure ([address]    - lacosamide (VIMPAT) 200 MG tablet; Take 1 tablet by mouth 2 times daily for 60 days. Dispense: 60 tablet; Refill: 0      I affirm this is a Patient Initiated Episode with a Patient who has not had a related appointment within my department in the past 7 days or scheduled within the next 24 hours. Patient identification was verified at the start of the visit: Yes    Total Time: minutes: 21-30 minutes    The visit was conducted pursuant to the emergency declaration under the [facility], [address] [place] [address] authority and the [employer] General Act. Patient identification was verified, and a caregiver was present when appropriate. The patient was located in a state where the provider was credentialed to provide care.     Note: not billable if this call serves to triage the patient into an appointment for the relevant concern      SHO Olmos

## 2022-08-29 ENCOUNTER — CARE COORDINATION (OUTPATIENT)
Dept: CARE COORDINATION | Age: 22
End: 2022-08-29

## 2022-08-31 ENCOUNTER — CARE COORDINATION (OUTPATIENT)
Dept: CARE COORDINATION | Age: 22
End: 2022-08-31

## 2022-08-31 SDOH — ECONOMIC STABILITY: TRANSPORTATION INSECURITY
IN THE PAST 12 MONTHS, HAS THE LACK OF TRANSPORTATION KEPT YOU FROM MEDICAL APPOINTMENTS OR FROM GETTING MEDICATIONS?: YES

## 2022-08-31 SDOH — ECONOMIC STABILITY: HOUSING INSECURITY: IN THE LAST 12 MONTHS, HOW MANY PLACES HAVE YOU LIVED?: 1

## 2022-08-31 SDOH — ECONOMIC STABILITY: HOUSING INSECURITY
IN THE LAST 12 MONTHS, WAS THERE A TIME WHEN YOU DID NOT HAVE A STEADY PLACE TO SLEEP OR SLEPT IN A SHELTER (INCLUDING NOW)?: NO

## 2022-08-31 SDOH — ECONOMIC STABILITY: TRANSPORTATION INSECURITY
IN THE PAST 12 MONTHS, HAS LACK OF TRANSPORTATION KEPT YOU FROM MEETINGS, WORK, OR FROM GETTING THINGS NEEDED FOR DAILY LIVING?: YES

## 2022-08-31 SDOH — ECONOMIC STABILITY: INCOME INSECURITY: IN THE LAST 12 MONTHS, WAS THERE A TIME WHEN YOU WERE NOT ABLE TO PAY THE MORTGAGE OR RENT ON TIME?: NO

## 2022-08-31 ASSESSMENT — SOCIAL DETERMINANTS OF HEALTH (SDOH): HOW HARD IS IT FOR YOU TO PAY FOR THE VERY BASICS LIKE FOOD, HOUSING, MEDICAL CARE, AND HEATING?: HARD

## 2022-08-31 ASSESSMENT — LIFESTYLE VARIABLES
HOW MANY STANDARD DRINKS CONTAINING ALCOHOL DO YOU HAVE ON A TYPICAL DAY: 5 OR 6
HOW OFTEN DO YOU HAVE A DRINK CONTAINING ALCOHOL: 4 OR MORE TIMES A WEEK

## 2022-08-31 NOTE — CARE COORDINATION
Ambulatory Care Coordination Note  8/31/2022    ACC: Allegra Hooker, RN    Summary Note: Call to pt for acm outreach. Explained role and agreeable to outreach. Reports has been drinking a lot due to being \"bored',  Has been Tapering down off of cigarettes. Discussed [employer] services with pt and provided contact info. Pt agreeable to referral if acm able to refer (unsure if pt needs to self refer/acm will contact [employer]). Pt interested in an possible transpo assist as reports it is so difficult getting rides, no apptmt scheduled yet for neurologist in [facility]. Taking meds ok. Feels like he has frequent seizures where he ones out possible staring seizures, up to 3 day. Advised not to drink alcohol with seizure meds. Call to [employer], left message. Pt would need to call to come in and schedule an assessment. Spoke with Grabiel Pop. On chart review Unsure if pt was referred to another provider for neuro besides [facility] per media from 7/2021 [facility] not taking new raji pts. Plan: Will refer to [employer] if possible. Will refer to  re  food pantries. Will contact [facility] for transpo assist and will contact pcp re neuro referral and continue to follow. Ambulatory Care Coordination Assessment    Care Coordination Protocol  Referral from Primary Care Provider: Yes  Week 1 - Initial Assessment     Do you have all of your prescriptions and are they filled?: Yes  Barriers to medication adherence: None  Are you able to afford your medications?: Yes  How often do you have trouble taking your medications the way you have been told to take them?: I always take them as prescribed. Do you have Home O2 Therapy?: No      Ability to seek help/take action for Emergent Urgent situations i.e. fire, crime, inclement weather or health crisis. : Independent  Ability to ambulate to restroom: Independent  Ability handle personal hygeine needs (bathing/dressing/grooming):  Independent  Ability to manage Medications: Independent  Ability to prepare Food Preparation: Independent  Ability to maintain home (clean home, laundry): Independent  Ability to drive and/or has transportation: Dependent  Ability to do shopping: Independent  Ability to manage finances: Independent  Is patient able to live independently?: Yes     Current Housing: Apartment                 Suggested Interventions and [employer]   Social Work: In Process   Specialty Service Referral: In Process   Transportation Services: In Process                    Prior to Admission medications    Medication Sig Start Date End Date Taking? Authorizing Provider   lacosamide (VIMPAT) 200 MG tablet Take 1 tablet by mouth 2 times daily for 60 days.  8/26/22 10/25/22  SHO Armstrong   traZODone (DESYREL) 50 MG tablet Take 2 tablets by mouth nightly 7/15/22   SHO Armstrong       Future Appointments   Date Time Provider [place] Branch   10/20/2022  8:30 AM Jasbir Gill [facility]      and   General Assessment    Do you have any symptoms that are causing concern?: No

## 2022-09-02 ENCOUNTER — CARE COORDINATION (OUTPATIENT)
Dept: CARE COORDINATION | Age: 22
End: 2022-09-02

## 2022-09-02 NOTE — CARE COORDINATION
Provided info on [employer] transpo and phone number for member services. ALso [facility] prefer self referral and reports he has number and denies need for further assistance with this. Denies needs at this time will contnue to follow.

## 2022-09-02 NOTE — CARE COORDINATION
HC attempted to contact patient in regards to [facility] for possible food insecurities and any other social needs. Patient is unavailable at this time. Left message with [facility] OF [facility]. contact information, reason for call and request for call back. Will expect a return call. If no call back, Will attempt to reach out to patient again in a few days.

## 2022-09-08 ENCOUNTER — CARE COORDINATION (OUTPATIENT)
Dept: CARE COORDINATION | Age: 22
End: 2022-09-08

## 2022-09-08 NOTE — CARE COORDINATION
HC attempted to reach out to patient in regards to referral for possible food insecurity and any other possible social needs. Patient is unavailable at this time. Left message with [facility]. contact information, reason for call and request for return call. Will expect a call back. If no call back received, will attempt to reach out to patient again in a few days.

## 2022-09-13 ENCOUNTER — CARE COORDINATION (OUTPATIENT)
Dept: CARE COORDINATION | Age: 22
End: 2022-09-13

## 2022-09-13 NOTE — CARE COORDINATION
HC contacted patient in regards to referral from [facility] for possible social needs. HC explains role and reason for call and patient is agreeable to assistance. Patient assessment completed. Patient states that his housing and finances are stable but he just doesn't seem to have much left over for food. Patient states that he also struggles with transportation. Patient denies applying for benefits through [employer] but [facility]. encourages patient to do this at his earliest convenience. Patient is provided contact information for his local [employer] and [place] benefits. Patient is also provided with contact/ location information for his local food pantries and patient is agreeable to reach out to all resources provided. Patient states that he was aware that he may have some transportation benefits through his insurance but has not yet contacted them to find out what he qualifies for. Patient is encouraged to also reach out to them. Patient is given contact information [employer], [employer], and [employer]. Patient is agreeable and expresses appreciation for assistance. Patient has no other concerns or needs at this time. Will reach out to patient again in 1 week to follow up on resources.

## 2022-09-20 ENCOUNTER — CARE COORDINATION (OUTPATIENT)
Dept: CARE COORDINATION | Age: 22
End: 2022-09-20

## 2022-09-20 SDOH — ECONOMIC STABILITY: FOOD INSECURITY: WITHIN THE PAST 12 MONTHS, YOU WORRIED THAT YOUR FOOD WOULD RUN OUT BEFORE YOU GOT MONEY TO BUY MORE.: SOMETIMES TRUE

## 2022-09-20 SDOH — ECONOMIC STABILITY: FOOD INSECURITY: WITHIN THE PAST 12 MONTHS, THE FOOD YOU BOUGHT JUST DIDN'T LAST AND YOU DIDN'T HAVE MONEY TO GET MORE.: SOMETIMES TRUE

## 2022-09-21 ENCOUNTER — CARE COORDINATION (OUTPATIENT)
Dept: CARE COORDINATION | Age: 22
End: 2022-09-21

## 2022-09-21 NOTE — CARE COORDINATION
HC attempted to reach out to patient to follow up in regards to resources provided and other possible social needs. Patient is unavailable at this time. Left message with [facility]. contact information, reason for call and request for call back. Will expect a return call. If no call back, will attempt to contact patient again in a few days.

## 2022-09-26 ENCOUNTER — CARE COORDINATION (OUTPATIENT)
Dept: CARE COORDINATION | Age: 22
End: 2022-09-26

## 2022-09-26 NOTE — CARE COORDINATION
Attempted to reach patient for continued Care Coordination follow up and education. Patient was unavailable at the time of my call, and a generic voicemail message was left asking patient to return my call at [phone].

## 2022-09-28 ENCOUNTER — CARE COORDINATION (OUTPATIENT)
Dept: CARE COORDINATION | Age: 22
End: 2022-09-28

## 2022-09-28 NOTE — CARE COORDINATION
HC attempted to contact patient to follow up in regards to social needs. Patient is unavailable at this time. Left message with [facility]. contact information, reason for call and request for call back. Will expect a return call. If no call back, will attempt to reach out again in a few days.

## 2022-10-03 ENCOUNTER — CARE COORDINATION (OUTPATIENT)
Dept: CARE COORDINATION | Age: 22
End: 2022-10-03

## 2022-10-03 NOTE — CARE COORDINATION
Attempted to reach patient for continued Care Coordination follow up and education. Patient was unavailable at the time of my call, and a generic voicemail message was left asking patient to return my call at [phone].

## 2022-10-04 ENCOUNTER — CARE COORDINATION (OUTPATIENT)
Dept: CARE COORDINATION | Age: 22
End: 2022-10-04

## 2022-10-04 NOTE — CARE COORDINATION
HC attempted to contact patient to follow up in regards to social needs. Patient is unavailable at this time. Unable to leave a message due to patients voicemail box not being set up yet. Will expect a return call. If no call back is received, HC will attempt to reach out again in a few days.

## 2022-10-07 DIAGNOSIS — R45.1 RESTLESSNESS AND AGITATION: ICD-10-CM

## 2022-10-07 DIAGNOSIS — F41.0 PANIC ATTACKS: ICD-10-CM

## 2022-10-07 DIAGNOSIS — G40.919 BREAKTHROUGH SEIZURE (HCC): ICD-10-CM

## 2022-10-07 DIAGNOSIS — F51.04 PSYCHOPHYSIOLOGICAL INSOMNIA: ICD-10-CM

## 2022-10-07 DIAGNOSIS — G40.909 SEIZURE DISORDER (HCC): ICD-10-CM

## 2022-10-07 RX ORDER — TRAZODONE HYDROCHLORIDE 50 MG/1
100 TABLET ORAL NIGHTLY
Qty: 60 TABLET | Refills: 2 | Status: SHIPPED | OUTPATIENT
Start: 2022-10-07

## 2022-10-07 RX ORDER — LACOSAMIDE 200 MG/1
200 TABLET ORAL 2 TIMES DAILY
Qty: 60 TABLET | Refills: 0 | Status: SHIPPED | OUTPATIENT
Start: 2022-10-07 | End: 2022-12-06

## 2022-10-10 ENCOUNTER — CARE COORDINATION (OUTPATIENT)
Dept: CARE COORDINATION | Age: 22
End: 2022-10-10

## 2022-10-10 NOTE — CARE COORDINATION
HC contacted patient to follow up in regards to social needs. Patient states that he shattered his phone and has been without a working phone for over 2 weeks. Patient states that he just replaced it. Patient reports that he is doing well. Patient inquires about prescription refill request. HC advises that his refill request was approved and sent to his pharmacy. Patient is advised to reach out to his pharmacy to see if they have been filled. Patient is agreeable. Patient states that he hasnt had a chance to utilize the food sampson provided but planned to go this Saturday. Patient denies any other needs or concerns at this time. Patient is encouraged to reach out to [facility]. if needed. Will follow up with patient again in 1 week.

## 2022-10-17 ENCOUNTER — CARE COORDINATION (OUTPATIENT)
Dept: CARE COORDINATION | Age: 22
End: 2022-10-17

## 2022-10-17 NOTE — CARE COORDINATION
HC attempted to contact patient to follow up in regards to social needs. Patient is unavailable at this time. Unable to leave a message due to voicemail box not being set up . Will expect a return call. If no call back is received, HC will attempt to reach out again in a few days.

## 2022-10-18 ENCOUNTER — CARE COORDINATION (OUTPATIENT)
Dept: CARE COORDINATION | Age: 22
End: 2022-10-18

## 2022-10-18 NOTE — CARE COORDINATION
Ambulatory Care Coordination Note  10/18/2022    ACC: Monique Jimenez, RN    Call to pt for acm f/u. Interested in possibly seeing counselor. Discussed [facility] and does not feels like alcohol is a problem león this time and feels that counseling may be more beneficial. Had went to [facility] in the past. Has medcial marijuana card. Reports he has a lot of difficulty with sleeping and sleep walking due to seizures. Reports was not able to fu with neuro in past and would like to go fo follow up at this point. Would prefer to go to neuro in [facility]. Scheduled f/u ov with pcp nd pt agreeable to calling [facility] for transpo. Ambulatory Care Coordination Assessment    Care Coordination Protocol  Referral from Primary Care Provider: Yes  Week 1 - Initial Assessment     Do you have all of your prescriptions and are they filled?: Yes  Barriers to medication adherence: None  Are you able to afford your medications?: Yes  How often do you have trouble taking your medications the way you have been told to take them?: I always take them as prescribed. Do you have Home O2 Therapy?: No      Ability to seek help/take action for Emergent Urgent situations i.e. fire, crime, inclement weather or health crisis. : Independent  Ability to ambulate to restroom: Independent  Ability handle personal hygeine needs (bathing/dressing/grooming): Independent  Ability to manage Medications: Independent  Ability to prepare Food Preparation: Independent  Ability to maintain home (clean home, laundry): Independent  Ability to drive and/or has transportation: Dependent  Ability to do shopping: Independent  Ability to manage finances: Independent  Is patient able to live independently?: Yes     Current Housing: Apartment                 Suggested Interventions and [employer]   Social Work: In Process   Specialty Service Referral: In Process   Transportation Services:  In Process                  Prior to Admission medications    Medication Sig Start Date End Date Taking? Authorizing Provider   traZODone (DESYREL) 50 MG tablet Take 2 tablets by mouth nightly 10/7/22   SHO Brown   lacosamide (VIMPAT) 200 MG tablet Take 1 tablet by mouth 2 times daily for 60 days.  10/7/22 12/6/22  SHO Brown       Future Appointments   Date Time Provider Lobito Branch   11/9/2022 11:00 AM Georgie Brown [address] [place] [facility]      and   General Assessment

## 2022-10-19 DIAGNOSIS — G40.909 SEIZURE DISORDER (HCC): Primary | ICD-10-CM

## 2022-10-19 DIAGNOSIS — G40.919 BREAKTHROUGH SEIZURE (HCC): ICD-10-CM

## 2022-10-21 NOTE — CARE COORDINATION
Pt advised of info and voices understanding. Denies further needs at this time. Will continue to follow for any assistance with referrals.

## 2022-10-25 ENCOUNTER — CARE COORDINATION (OUTPATIENT)
Dept: CARE COORDINATION | Age: 22
End: 2022-10-25

## 2022-10-25 NOTE — CARE COORDINATION
HC attempted to contact patient to follow up in regards to social needs. Patient is unavailable at this time. Unable to leave a message due to Voicemail box not set up. Will expect a return call. If no call back is received, HC will attempt to reach out again in a few days.

## 2022-10-28 ENCOUNTER — CARE COORDINATION (OUTPATIENT)
Dept: CARE COORDINATION | Age: 22
End: 2022-10-28

## 2022-10-28 NOTE — CARE COORDINATION
HC attempted to reach out to patient to follow up in regards to social needs. Patient is unavailable at this time. Unable to leave a message ezequiel to no voicemail set up. HC has made multiple attempts to follow up with patient regarding resources provided and any other possible social needs. Attempts have been unsuccessful and no return calls have been received. HC will sign off of patients care team at this time.

## 2022-11-04 ENCOUNTER — CARE COORDINATION (OUTPATIENT)
Dept: CARE COORDINATION | Age: 22
End: 2022-11-04

## 2022-11-04 NOTE — CARE COORDINATION
Ambulatory Care Coordination Note      ACC: Chioma Sykes, RN    Call to pt for acm f/u. Reports [facility] does not take his insurance. Plan: Will look into coverage by [facility] for counseling and F/u with neuro in r/t referral.     Lab Results       None            Care Coordination Interventions    Referral from Primary Care Provider: Yes  Suggested Interventions and Community Resources  Social Work: Completed  Specialty Services Referral: In Process  Transportation Support: Completed          Goals Addressed                   This [facility] Resource Goal   On track     I will utilize transportation through [facility] when needed for apptmts. Barriers: lack of motivation and lack of support  Plan for overcoming my barriers: reinforcing resource availibility/contact info provided  Confidence: 9/10  Anticipated Goal Completion Date: 12/9/22              Prior to Admission medications    Medication Sig Start Date End Date Taking? Authorizing Provider   traZODone (DESYREL) 50 MG tablet Take 2 tablets by mouth nightly 10/7/22   SHO Arora   lacosamide (VIMPAT) 200 MG tablet Take 1 tablet by mouth 2 times daily for 60 days.  10/7/22 12/6/22  SHO Arora       Future Appointments   Date Time Provider Lobito Branch   11/9/2022 11:00 AM Georgie Arora [address] [place] [address] [place] [facility]    and   General Assessment    Do you have any symptoms that are causing concern?: No

## 2022-11-09 ENCOUNTER — OFFICE VISIT (OUTPATIENT)
Dept: FAMILY MEDICINE CLINIC | Age: 22
End: 2022-11-09
Payer: COMMERCIAL

## 2022-11-09 VITALS
HEIGHT: 70 IN | SYSTOLIC BLOOD PRESSURE: 136 MMHG | BODY MASS INDEX: 26.1 KG/M2 | DIASTOLIC BLOOD PRESSURE: 80 MMHG | WEIGHT: 182.3 LBS | OXYGEN SATURATION: 98 % | HEART RATE: 59 BPM

## 2022-11-09 DIAGNOSIS — Z20.2 EXPOSURE TO STD: ICD-10-CM

## 2022-11-09 DIAGNOSIS — G40.909 SEIZURE DISORDER (HCC): ICD-10-CM

## 2022-11-09 DIAGNOSIS — G40.919 BREAKTHROUGH SEIZURE (HCC): ICD-10-CM

## 2022-11-09 DIAGNOSIS — Z20.2 EXPOSURE TO STD: Primary | ICD-10-CM

## 2022-11-09 DIAGNOSIS — F10.930 ALCOHOL WITHDRAWAL SYNDROME WITHOUT COMPLICATION (HCC): ICD-10-CM

## 2022-11-09 DIAGNOSIS — Z00.00 WELLNESS EXAMINATION: ICD-10-CM

## 2022-11-09 LAB
A/G RATIO: 2 (ref 1.1–2.2)
ALBUMIN SERPL-MCNC: 4.3 G/DL (ref 3.4–5)
ALP BLD-CCNC: 68 U/L (ref 40–129)
ALT SERPL-CCNC: 12 U/L (ref 10–40)
ANION GAP SERPL CALCULATED.3IONS-SCNC: 9 MMOL/L (ref 3–16)
AST SERPL-CCNC: 15 U/L (ref 15–37)
BASOPHILS ABSOLUTE: 0 K/UL (ref 0–0.2)
BASOPHILS RELATIVE PERCENT: 0.7 %
BILIRUB SERPL-MCNC: 0.4 MG/DL (ref 0–1)
BUN BLDV-MCNC: 11 MG/DL (ref 7–20)
CALCIUM SERPL-MCNC: 9.3 MG/DL (ref 8.3–10.6)
CHLORIDE BLD-SCNC: 99 MMOL/L (ref 99–110)
CO2: 26 MMOL/L (ref 21–32)
CREAT SERPL-MCNC: 1 MG/DL (ref 0.9–1.3)
EOSINOPHILS ABSOLUTE: 0.1 K/UL (ref 0–0.6)
EOSINOPHILS RELATIVE PERCENT: 1.2 %
GFR SERPL CREATININE-BSD FRML MDRD: >60 ML/MIN/{1.73_M2}
GLUCOSE BLD-MCNC: 83 MG/DL (ref 70–99)
HCT VFR BLD CALC: 45.4 % (ref 40.5–52.5)
HEMOGLOBIN: 15.5 G/DL (ref 13.5–17.5)
LYMPHOCYTES ABSOLUTE: 1 K/UL (ref 1–5.1)
LYMPHOCYTES RELATIVE PERCENT: 15.5 %
MCH RBC QN AUTO: 34 PG (ref 26–34)
MCHC RBC AUTO-ENTMCNC: 34.1 G/DL (ref 31–36)
MCV RBC AUTO: 99.6 FL (ref 80–100)
MONOCYTES ABSOLUTE: 0.7 K/UL (ref 0–1.3)
MONOCYTES RELATIVE PERCENT: 10.3 %
NEUTROPHILS ABSOLUTE: 4.7 K/UL (ref 1.7–7.7)
NEUTROPHILS RELATIVE PERCENT: 72.3 %
PDW BLD-RTO: 14.1 % (ref 12.4–15.4)
PLATELET # BLD: 241 K/UL (ref 135–450)
PMV BLD AUTO: 6.9 FL (ref 5–10.5)
POTASSIUM SERPL-SCNC: 4.8 MMOL/L (ref 3.5–5.1)
RBC # BLD: 4.56 M/UL (ref 4.2–5.9)
SODIUM BLD-SCNC: 134 MMOL/L (ref 136–145)
TOTAL PROTEIN: 6.5 G/DL (ref 6.4–8.2)
WBC # BLD: 6.5 K/UL (ref 4–11)

## 2022-11-09 PROCEDURE — G8482 FLU IMMUNIZE ORDER/ADMIN: HCPCS | Performed by: PHYSICIAN ASSISTANT

## 2022-11-09 PROCEDURE — G8419 CALC BMI OUT NRM PARAM NOF/U: HCPCS | Performed by: PHYSICIAN ASSISTANT

## 2022-11-09 PROCEDURE — 90471 IMMUNIZATION ADMIN: CPT | Performed by: PHYSICIAN ASSISTANT

## 2022-11-09 PROCEDURE — 4004F PT TOBACCO SCREEN RCVD TLK: CPT | Performed by: PHYSICIAN ASSISTANT

## 2022-11-09 PROCEDURE — 90674 CCIIV4 VAC NO PRSV 0.5 ML IM: CPT | Performed by: PHYSICIAN ASSISTANT

## 2022-11-09 PROCEDURE — G8427 DOCREV CUR MEDS BY ELIG CLIN: HCPCS | Performed by: PHYSICIAN ASSISTANT

## 2022-11-09 PROCEDURE — 99213 OFFICE O/P EST LOW 20 MIN: CPT | Performed by: PHYSICIAN ASSISTANT

## 2022-11-09 NOTE — PROGRESS NOTES
11/9/2022    [address]    Chief Complaint   Patient presents with    3 Month Follow-Up       HPI  History was obtained from pt. Esther Carrasquillo is a 23 y.o. male with a PMHx as listed below who presents today for 3 month for med refill. Neurology -patient has not followed up with them yet. Seizures has had 8 since we last had a visit. He hasn't had any seizures since he quit drinking one week ago. Since then he hasn't been feeling great. Had a few days of sweating, puking, etc, but no fevers or seizures, no hallucinations. Pt is taking the higher dose of vimpat (200) mg. Pt has been sleeping all evening after work, but today he is starting to feel a lot better. The seizures on 200 mg are better than the ones on 150 mg. He feels like he is still conscious and able to somewhat function during them. He hasn't had anymore seizures where he completely \"goes out\". Pt recently broke up and his previous GF got sick with gonorrhea. Patient is not having any symptoms but he would like checked. 1. Exposure to STD    2. Seizure disorder ([address]    3. Breakthrough seizure ([address]    4. Alcohol withdrawal syndrome without complication ([address]           REVIEW OF SYMPTOMS    Review of Systems    PAST MEDICAL HISTORY  Past Medical History:   Diagnosis Date    Depression     Seizures ([address]     Suicidal intent        FAMILY HISTORY  No family history on file.     SOCIAL HISTORY  Social History     Socioeconomic History    Marital status: Single   Tobacco Use    Smoking status: Some Days     Packs/day: 1.00     Years: 7.00     Pack years: 7.00     Types: Cigarettes     Start date: 2014    Smokeless tobacco: Never   Vaping Use    Vaping Use: Never used   Substance and Sexual Activity    Alcohol use: Yes     Comment: drinks pints    Drug use: Yes     Types: Marijuana Alfornia Cashing)   Social History Narrative    ** Merged History Encounter **          Social Determinants of Health     Financial Resource Strain: High Risk    Difficulty of Paying Living Expenses: Hard   Food Insecurity: Food Insecurity Present    Worried About Running Out of Food in the Last Year: Sometimes true    Ran Out of Food in the Last Year: Sometimes true   Transportation Needs: Unmet Transportation Needs    Lack of Transportation (Medical): Yes    Lack of Transportation (Non-Medical): Yes   Housing Stability: Low Risk     Unable to Pay for Housing in the Last Year: No    Number of [place] in the Last Year: 1    Unstable Housing in the Last Year: No        SURGICAL HISTORY  No past surgical history on file. CURRENT MEDICATIONS  Current Outpatient Medications   Medication Sig Dispense Refill    traZODone (DESYREL) 50 MG tablet Take 2 tablets by mouth nightly 60 tablet 2    lacosamide (VIMPAT) 200 MG tablet Take 1 tablet by mouth 2 times daily for 60 days. 60 tablet 0     No current facility-administered medications for this visit. ALLERGIES  Allergies   Allergen Reactions    Naproxen Rash       PHYSICAL EXAM    /80 (Site: Left Upper Arm, Position: Sitting, Cuff Size: Medium Adult)   Pulse 59   Ht 5' 9.5\" (1.765 m)   Wt 182 lb 4.8 oz (82.7 kg)   SpO2 98%   BMI 26.54 kg/m²     Physical Exam    ASSESSMENT & PLAN    1. Exposure to STD  Patient currently asymptomatic  - HIV Screen; Future  - Hepatitis C Antibody; Future  - Herpes Simplex Virus,I/II,IgG; Future  - C.trachomatis N.gonorrhoeae DNA, Urine; Future  - RPR REFLEX; Future    2. Seizure disorder ([address]  Continue with Vimpat 200 mg    3. Breakthrough seizure ([address]      4. Alcohol withdrawal syndrome without complication ([address]  Pt recovering, feeling much better today    Return in about 3 months (around 2/9/2023).          Electronically signed by Georgie Baird on 11/9/2022      Comment: Please note this report has been produced using speech recognition software and may contain errors related to that system including errors in grammar, punctuation, and spelling, as well as words and phrases that may be inappropriate. If there are any questions or concerns please feel free to contact the dictating provider for clarification.

## 2022-11-10 ENCOUNTER — CARE COORDINATION (OUTPATIENT)
Dept: CARE COORDINATION | Age: 22
End: 2022-11-10

## 2022-11-10 LAB
C. TRACHOMATIS DNA ,URINE: NEGATIVE
HEPATITIS C ANTIBODY INTERPRETATION: NORMAL
HERPES SIMPLEX VIRUS 1 IGG: POSITIVE
HERPES SIMPLEX VIRUS 2 IGG: NEGATIVE
N. GONORRHOEAE DNA, URINE: NEGATIVE
TOTAL SYPHILLIS IGG/IGM: NORMAL

## 2022-11-10 NOTE — CARE COORDINATION
Call to [facility] neuro for f/u on pt referral. No answer lm requesting update on referral status. Rcvd callback from neuro that they do have referral and will call pt to schedule. PLan: f/u wth pt in regard to referral reiterate transpo availability and grad at next encounter.

## 2022-11-11 ENCOUNTER — CARE COORDINATION (OUTPATIENT)
Dept: CARE COORDINATION | Age: 22
End: 2022-11-11

## 2022-11-11 LAB — MISCELLANEOUS LAB TEST ORDER: NORMAL

## 2022-11-11 NOTE — CARE COORDINATION
HC reached out to patient in regards to [facility] request for possible need for assistance with counseling services in patients area. Patient states that he is no longer interested in counseling services at this time. HC states that counseling resources can still be provided just in case patient has a change of mind in the future. Patient is agreeable. HC will research in patients area. Patient states that he is also struggling to locate a dentist that accepts his insurance. Patient is provided contact information for [facility] and [address] [place] [address] dental. Patient is agreeable to reach out to those contacts to see if they would work for him. Patient is agreeable to a follow up call next week to go over any further resources needed. Patient denies any other needs or concerns at this time. HC will research other possible resources and follow up with patient again next week.

## 2022-11-20 DIAGNOSIS — G40.909 SEIZURE DISORDER (HCC): ICD-10-CM

## 2022-11-20 DIAGNOSIS — G40.919 BREAKTHROUGH SEIZURE (HCC): ICD-10-CM

## 2022-11-21 ENCOUNTER — CARE COORDINATION (OUTPATIENT)
Dept: CARE COORDINATION | Age: 22
End: 2022-11-21

## 2022-11-21 RX ORDER — LACOSAMIDE 200 MG/1
200 TABLET ORAL 2 TIMES DAILY
Qty: 60 TABLET | Refills: 0 | Status: SHIPPED | OUTPATIENT
Start: 2022-11-21 | End: 2023-01-20

## 2022-11-21 NOTE — CARE COORDINATION
HC attempted to contact patient to follow up in regards to social needs. Patient is unavailable at this time. Unable to leave a message due to voicemail box not set up. Will expect a return call. If no call back is received, HC will attempt to reach out again next week.

## 2022-11-22 ENCOUNTER — CARE COORDINATION (OUTPATIENT)
Dept: CARE COORDINATION | Age: 22
End: 2022-11-22

## 2022-11-29 ENCOUNTER — CARE COORDINATION (OUTPATIENT)
Dept: CARE COORDINATION | Age: 22
End: 2022-11-29

## 2022-11-29 NOTE — CARE COORDINATION
HC attempted to contact patient to follow up in regards to social needs. Patient is unavailable at this time. Unable to leave message due to voicemail box not set up. Will expect a return call. If no call back is received, HC will attempt to reach out again in a few days.

## 2022-12-05 ENCOUNTER — CARE COORDINATION (OUTPATIENT)
Dept: CARE COORDINATION | Age: 22
End: 2022-12-05

## 2022-12-05 NOTE — CARE COORDINATION
HC attempted to reach out to patient to follow up in regards to social needs at this time. Patient is unavailable and voicemail box is not set up. HC is unable to leave a message. HC has made multiple attempts to reach out to patient without success and no return calls. HC will sign off of patients care team at this time.

## 2022-12-06 ENCOUNTER — CARE COORDINATION (OUTPATIENT)
Dept: CARE COORDINATION | Age: 22
End: 2022-12-06

## 2023-01-03 DIAGNOSIS — G40.909 SEIZURE DISORDER (HCC): ICD-10-CM

## 2023-01-03 DIAGNOSIS — G40.919 BREAKTHROUGH SEIZURE (HCC): ICD-10-CM

## 2023-01-03 RX ORDER — LACOSAMIDE 200 MG/1
200 TABLET ORAL 2 TIMES DAILY
Qty: 60 TABLET | Refills: 0 | Status: SHIPPED | OUTPATIENT
Start: 2023-01-03 | End: 2023-03-04

## 2023-01-03 NOTE — TELEPHONE ENCOUNTER
----- Message from Eusebia sent at 1/3/2023  2:14 PM EST -----  Subject: Referral Request    Reason for referral request? Patient is requesting another referral for   seiz[facility]  Provider patient wants to be referred to(if known):     Provider Phone Number(if known): Additional Information for Provider?   ---------------------------------------------------------------------------  --------------  [id number]    [phone];  Do not leave any message, patient will call back for answer  ---------------------------------------------------------------------------  --------------

## 2023-01-05 ENCOUNTER — TELEPHONE (OUTPATIENT)
Dept: FAMILY MEDICINE CLINIC | Age: 23
End: 2023-01-05

## 2023-01-05 DIAGNOSIS — G40.909 SEIZURE DISORDER (HCC): ICD-10-CM

## 2023-01-05 DIAGNOSIS — G40.919 BREAKTHROUGH SEIZURE (HCC): ICD-10-CM

## 2023-01-05 RX ORDER — LACOSAMIDE 200 MG/1
200 TABLET ORAL 2 TIMES DAILY
Qty: 60 TABLET | Refills: 0 | OUTPATIENT
Start: 2023-01-05 | End: 2023-03-06

## 2023-01-05 NOTE — TELEPHONE ENCOUNTER
Called and notified patient RX he requested was sent to his Pharmacy two days ago. He verbalized understanding.

## 2023-02-07 DIAGNOSIS — G40.909 SEIZURE DISORDER (HCC): ICD-10-CM

## 2023-02-07 DIAGNOSIS — G40.919 BREAKTHROUGH SEIZURE (HCC): ICD-10-CM

## 2023-02-08 RX ORDER — LACOSAMIDE 200 MG/1
200 TABLET ORAL 2 TIMES DAILY
Qty: 60 TABLET | Refills: 0 | Status: SHIPPED | OUTPATIENT
Start: 2023-02-08 | End: 2023-04-09

## 2023-02-09 ENCOUNTER — TELEMEDICINE (OUTPATIENT)
Dept: FAMILY MEDICINE CLINIC | Age: 23
End: 2023-02-09

## 2023-02-09 DIAGNOSIS — R45.1 RESTLESSNESS AND AGITATION: ICD-10-CM

## 2023-02-09 DIAGNOSIS — F51.04 PSYCHOPHYSIOLOGICAL INSOMNIA: ICD-10-CM

## 2023-02-09 DIAGNOSIS — F41.0 PANIC ATTACKS: ICD-10-CM

## 2023-02-09 DIAGNOSIS — G40.909 SEIZURE DISORDER (HCC): ICD-10-CM

## 2023-02-09 DIAGNOSIS — G40.919 BREAKTHROUGH SEIZURE (HCC): ICD-10-CM

## 2023-02-09 RX ORDER — TRAZODONE HYDROCHLORIDE 50 MG/1
100 TABLET ORAL NIGHTLY
Qty: 60 TABLET | Refills: 2 | Status: SHIPPED | OUTPATIENT
Start: 2023-02-09

## 2023-02-09 NOTE — PROGRESS NOTES
2/9/2023    [address]    Chief Complaint   Patient presents with    3 Month Follow-Up       HPI  History was obtained from pt. Selma Escobar is a 23 y.o. male with a PMHx as listed below who presents today for 3 month follow up, pt working and exercising doing pretty well. He reports 2 seizures since the last visit - one did not render him completely unconscious but the other did, but it was still a minor one compared to before. They were in December, hasn't had one in a month or more. Pt has been drinking off and on but he is sober right now and doing pretty well. His sleep is alright, he doesn't take the trazodone every night, just when he needs it. 1. Seizure disorder ([address]    2. Breakthrough seizure ([address]    3. Restlessness and agitation    4. Panic attacks    5. Psychophysiological insomnia         REVIEW OF SYMPTOMS    Review of Systems    PAST MEDICAL HISTORY  Past Medical History:   Diagnosis Date    Depression     Seizures ([address]     Suicidal intent        FAMILY HISTORY  No family history on file.     SOCIAL HISTORY  Social History     Socioeconomic History    Marital status: Single   Tobacco Use    Smoking status: Some Days     Packs/day: 1.00     Years: 7.00     Pack years: 7.00     Types: Cigarettes     Start date: 2014    Smokeless tobacco: Never   Vaping Use    Vaping Use: Never used   Substance and Sexual Activity    Alcohol use: Yes     Comment: drinks pints    Drug use: Yes     Types: Marijuana Lucila Perez)   Social History Narrative    ** Merged History Encounter **          Social Determinants of Health     Financial Resource Strain: High Risk    Difficulty of Paying Living Expenses: Hard   Food Insecurity: Food Insecurity Present    Worried About Running Out of Food in the Last Year: Sometimes true    Ran Out of Food in the Last Year: Sometimes true   Transportation Needs: Unmet Transportation Needs    Lack of Transportation (Medical): Yes    Lack of Transportation (Non-Medical): Yes   Housing Stability: Low Risk     Unable to Pay for Housing in the Last Year: No    Number of Places Lived in the Last Year: 1    Unstable Housing in the Last Year: No        SURGICAL HISTORY  No past surgical history on file.            CURRENT MEDICATIONS  Current Outpatient Medications   Medication Sig Dispense Refill    traZODone (DESYREL) 50 MG tablet Take 2 tablets by mouth nightly 60 tablet 2    lacosamide (VIMPAT) 200 MG tablet Take 1 tablet by mouth 2 times daily for 60 days. 60 tablet 0     No current facility-administered medications for this visit.       ALLERGIES  Allergies   Allergen Reactions    Naproxen Rash       PHYSICAL EXAM    There were no vitals taken for this visit.    Physical Exam  Constitutional:       General: He is not in acute distress.     Appearance: Normal appearance. He is normal weight. He is not ill-appearing, toxic-appearing or diaphoretic.   HENT:      Head: Normocephalic and atraumatic.      Nose: Nose normal.   Eyes:      General: No scleral icterus.        Right eye: No discharge.         Left eye: No discharge.      Extraocular Movements: Extraocular movements intact.      Conjunctiva/sclera: Conjunctivae normal.      Pupils: Pupils are equal, round, and reactive to light.   Neurological:      General: No focal deficit present.      Mental Status: He is alert and oriented to person, place, and time.      Cranial Nerves: No cranial nerve deficit.   Psychiatric:         Mood and Affect: Mood normal.         Behavior: Behavior normal.         Thought Content: Thought content normal.         Judgment: Judgment normal.       ASSESSMENT & PLAN    1. Seizure disorder (HCC)  Stable, pt is doing really well  Continue vimpat   Reviewed OARRS  No sign of abuse or diversion  Issue controlled.  Continue meds.  Refilled meds.      2. Breakthrough seizure (HCC)  Becoming more infrequent    3. Restlessness and agitation  resolved    4. Panic attacks  Well controlled    5. Psychophysiological  insomnia  improving    Return in about 3 months (around 5/9/2023). Electronically signed by SHO Whatley on 2/9/2023    Simona Ivan was evaluated through a synchronous (real-time) audio-video encounter. The patient (or guardian if applicable) is aware that this is a billable service. Verbal consent to proceed has been obtained within the past 12 months. The visit was conducted pursuant to the emergency declaration under the [address] [place] [address] and the [employer] General Act. Patient identification was verified, and a caregiver was present when appropriate. The patient was located in a state where the provider was credentialed to provide care. Total time spent for this encounter: Not billed by time    --SHO Whatley on 2/9/2023 at 8:35 AM    An electronic signature was used to authenticate this note. Comment: Please note this report has been produced using speech recognition software and may contain errors related to that system including errors in grammar, punctuation, and spelling, as well as words and phrases that may be inappropriate. If there are any questions or concerns please feel free to contact the dictating provider for clarification.

## 2023-02-13 ENCOUNTER — TELEPHONE (OUTPATIENT)
Dept: FAMILY MEDICINE CLINIC | Age: 23
End: 2023-02-13

## 2023-03-13 ENCOUNTER — HOSPITAL ENCOUNTER (EMERGENCY)
Age: 23
Discharge: PSYCHIATRIC OR SUBSTANCE USE FACILITY | End: 2023-03-14
Attending: EMERGENCY MEDICINE
Payer: COMMERCIAL

## 2023-03-13 DIAGNOSIS — F22 PARANOIA (HCC): Primary | ICD-10-CM

## 2023-03-13 DIAGNOSIS — R45.89 SUICIDAL BEHAVIOR WITHOUT ATTEMPTED SELF-INJURY: ICD-10-CM

## 2023-03-13 LAB
ACETAMINOPHEN LEVEL: <5 UG/ML (ref 15–30)
ALCOHOL SCREEN SERUM: 0.23 %WT/VOL
AMPHETAMINES: NEGATIVE
ANION GAP SERPL CALCULATED.3IONS-SCNC: 14 MMOL/L (ref 4–16)
BARBITURATE SCREEN URINE: NEGATIVE
BASOPHILS ABSOLUTE: 0.1 K/CU MM
BASOPHILS RELATIVE PERCENT: 1.3 % (ref 0–1)
BENZODIAZEPINE SCREEN, URINE: NEGATIVE
BILIRUBIN URINE: NEGATIVE MG/DL
BLOOD, URINE: NEGATIVE
BUN SERPL-MCNC: 8 MG/DL (ref 6–23)
CALCIUM SERPL-MCNC: 9.6 MG/DL (ref 8.3–10.6)
CANNABINOID SCREEN URINE: ABNORMAL
CHLORIDE BLD-SCNC: 103 MMOL/L (ref 99–110)
CLARITY: CLEAR
CO2: 26 MMOL/L (ref 21–32)
COCAINE METABOLITE: ABNORMAL
COLOR: COLORLESS
COMMENT UA: ABNORMAL
CREAT SERPL-MCNC: 0.9 MG/DL (ref 0.9–1.3)
DIFFERENTIAL TYPE: ABNORMAL
DOSE AMOUNT: ABNORMAL
DOSE AMOUNT: ABNORMAL
DOSE TIME: ABNORMAL
DOSE TIME: ABNORMAL
EOSINOPHILS ABSOLUTE: 0.1 K/CU MM
EOSINOPHILS RELATIVE PERCENT: 0.8 % (ref 0–3)
GFR SERPL CREATININE-BSD FRML MDRD: >60 ML/MIN/1.73M2
GLUCOSE SERPL-MCNC: 95 MG/DL (ref 70–99)
GLUCOSE, URINE: NEGATIVE MG/DL
HCT VFR BLD CALC: 45.9 % (ref 42–52)
HEMOGLOBIN: 15.9 GM/DL (ref 13.5–18)
IMMATURE NEUTROPHIL %: 0.7 % (ref 0–0.43)
KETONES, URINE: NEGATIVE MG/DL
LEUKOCYTE ESTERASE, URINE: NEGATIVE
LYMPHOCYTES ABSOLUTE: 3.4 K/CU MM
LYMPHOCYTES RELATIVE PERCENT: 37.7 % (ref 24–44)
MCH RBC QN AUTO: 32.4 PG (ref 27–31)
MCHC RBC AUTO-ENTMCNC: 34.6 % (ref 32–36)
MCV RBC AUTO: 93.5 FL (ref 78–100)
MONOCYTES ABSOLUTE: 0.6 K/CU MM
MONOCYTES RELATIVE PERCENT: 7.2 % (ref 0–4)
NITRITE URINE, QUANTITATIVE: NEGATIVE
NUCLEATED RBC %: 0 %
OPIATES, URINE: NEGATIVE
OXYCODONE: NEGATIVE
PDW BLD-RTO: 12.3 % (ref 11.7–14.9)
PH, URINE: 7 (ref 5–8)
PHENCYCLIDINE, URINE: NEGATIVE
PLATELET # BLD: 382 K/CU MM (ref 140–440)
PMV BLD AUTO: 8.3 FL (ref 7.5–11.1)
POTASSIUM SERPL-SCNC: 3.8 MMOL/L (ref 3.5–5.1)
PROTEIN UA: NEGATIVE MG/DL
RAPID INFLUENZA  B AGN: NEGATIVE
RAPID INFLUENZA A AGN: NEGATIVE
RBC # BLD: 4.91 M/CU MM (ref 4.6–6.2)
REASON FOR REJECTION: NORMAL
REJECTED TEST: NORMAL
SALICYLATE LEVEL: <0.3 MG/DL (ref 15–30)
SARS-COV-2 RDRP RESP QL NAA+PROBE: NOT DETECTED
SEGMENTED NEUTROPHILS ABSOLUTE COUNT: 4.7 K/CU MM
SEGMENTED NEUTROPHILS RELATIVE PERCENT: 52.3 % (ref 36–66)
SODIUM BLD-SCNC: 143 MMOL/L (ref 135–145)
SOURCE: NORMAL
SPECIFIC GRAVITY UA: <1.005 (ref 1–1.03)
TOTAL IMMATURE NEUTOROPHIL: 0.06 K/CU MM
TOTAL NUCLEATED RBC: 0 K/CU MM
UROBILINOGEN, URINE: 0.2 MG/DL (ref 0.2–1)
WBC # BLD: 8.9 K/CU MM (ref 4–10.5)

## 2023-03-13 PROCEDURE — 81003 URINALYSIS AUTO W/O SCOPE: CPT

## 2023-03-13 PROCEDURE — 87804 INFLUENZA ASSAY W/OPTIC: CPT

## 2023-03-13 PROCEDURE — 80048 BASIC METABOLIC PNL TOTAL CA: CPT

## 2023-03-13 PROCEDURE — 99285 EMERGENCY DEPT VISIT HI MDM: CPT

## 2023-03-13 PROCEDURE — 85025 COMPLETE CBC W/AUTO DIFF WBC: CPT

## 2023-03-13 PROCEDURE — 80307 DRUG TEST PRSMV CHEM ANLYZR: CPT

## 2023-03-13 PROCEDURE — G0480 DRUG TEST DEF 1-7 CLASSES: HCPCS

## 2023-03-13 PROCEDURE — 6370000000 HC RX 637 (ALT 250 FOR IP): Performed by: STUDENT IN AN ORGANIZED HEALTH CARE EDUCATION/TRAINING PROGRAM

## 2023-03-13 PROCEDURE — 87635 SARS-COV-2 COVID-19 AMP PRB: CPT

## 2023-03-13 RX ORDER — NICOTINE 21 MG/24HR
1 PATCH, TRANSDERMAL 24 HOURS TRANSDERMAL ONCE
Status: COMPLETED | OUTPATIENT
Start: 2023-03-13 | End: 2023-03-14

## 2023-03-13 RX ORDER — LACOSAMIDE 50 MG/1
200 TABLET ORAL 2 TIMES DAILY
Status: DISCONTINUED | OUTPATIENT
Start: 2023-03-13 | End: 2023-03-15 | Stop reason: HOSPADM

## 2023-03-13 RX ORDER — TRAZODONE HYDROCHLORIDE 50 MG/1
50 TABLET ORAL ONCE
Status: COMPLETED | OUTPATIENT
Start: 2023-03-13 | End: 2023-03-13

## 2023-03-13 RX ORDER — NICOTINE 21 MG/24HR
1 PATCH, TRANSDERMAL 24 HOURS TRANSDERMAL DAILY
Status: DISCONTINUED | OUTPATIENT
Start: 2023-03-14 | End: 2023-03-13

## 2023-03-13 RX ADMIN — TRAZODONE HYDROCHLORIDE 50 MG: 50 TABLET ORAL at 21:56

## 2023-03-13 RX ADMIN — LACOSAMIDE 200 MG: 50 TABLET, FILM COATED ORAL at 21:56

## 2023-03-13 NOTE — ED PROVIDER NOTES
[address] ENCOUNTER      Pt Name: Yvrose Torres  MRN: [id number]  [place] 2000  Date of evaluation: 3/13/2023  Provider: Marce Archer MD  Insurance:  Payor: Cherie Khait / Plan: Gwenith Hunger / Product Type: *No Product type* /   Current Code Status   Code Status: Not on file     CHIEF COMPLAINT       Chief Complaint   Patient presents with    Mental Health Problem     [place] wrote on a pink slip that patient sent a video to his sister with a gun to his head pulling the trigger. Patient denies any SI or HI to this RN. HISTORY OF PRESENT ILLNESS    HPI    Nursing Notes were reviewed. This is a 23 y.o. male who presents to the emergency department with reported suicidal ideation and demonstrated activity concerning for suicidal plan. The patient arrives in the emergency department with a pink slip signed by the police department. The patient reportedly sent a video to his girlfriend with a gun pointed at his head. The patient, however, states that he did not send recently and that he recorded it many years ago. He states he no longer owns a gun. The patient says that he was at his house with no particular concerns and the police showed up and insisted that he go to the hospital.  He is unclear why his girlfriend told the police that he was suicidal.  He denies suicidal ideation. He denies a plan to hurt himself. The patient is somewhat agitated with a number of paranoid statements, however, stating that he refuses to talk to me and give me any significant information without his  present. PAST MEDICAL HISTORY     Past Medical History:   Diagnosis Date    Depression     Seizures ([facility] [address]     Suicidal intent          SURGICAL HISTORY     No past surgical history on file.       CURRENT MEDICATIONS       Previous Medications    LACOSAMIDE (VIMPAT) 200 MG TABLET    Take 1 tablet by mouth 2 times daily for 60 days. TRAZODONE (DESYREL) 50 MG TABLET    Take 2 tablets by mouth nightly       ALLERGIES     Naproxen    FAMILY HISTORY     No family history on file. SOCIAL HISTORY       Social History     Socioeconomic History    Marital status: Single   Tobacco Use    Smoking status: Some Days     Packs/day: 1.00     Years: 7.00     Pack years: 7.00     Types: Cigarettes     Start date: 2014    Smokeless tobacco: Never   Vaping Use    Vaping Use: Never used   Substance and Sexual Activity    Alcohol use: Yes     Comment: drinks pints    Drug use: Yes     Types: Marijuana Dao Passy)   Social History Narrative    ** Merged History Encounter **          Social Determinants of Health     Financial Resource Strain: High Risk    Difficulty of Paying Living Expenses: Hard   Food Insecurity: Food Insecurity Present    Worried About Running Out of Food in the Last Year: Sometimes true    Ran Out of Food in the Last Year: Sometimes true   Transportation Needs: Unmet Transportation Needs    Lack of Transportation (Medical): Yes    Lack of Transportation (Non-Medical): Yes   Housing Stability: Low Risk     Unable to Pay for Housing in the Last Year: No    Number of [place] in the Last Year: 1    Unstable Housing in the Last Year: No       PHYSICAL EXAM       ED Triage Vitals [03/13/23 1732]   BP Temp Temp Source Heart Rate Resp SpO2 Height Weight   134/77 98.8 °F (37.1 °C) Oral 86 16 99 % 5' 9\" (1.753 m) 182 lb (82.6 kg)       Physical Exam  Vitals and nursing note reviewed. Constitutional:       Appearance: Normal appearance. HENT:      Head: Normocephalic and atraumatic. Nose: Nose normal.      Mouth/Throat:      Mouth: Mucous membranes are moist.   Eyes:      General: No scleral icterus. Extraocular Movements: Extraocular movements intact. Pupils: Pupils are equal, round, and reactive to light. Cardiovascular:      Rate and Rhythm: Normal rate and regular rhythm.    Pulmonary:      Effort: Pulmonary effort is normal.   Musculoskeletal:         General: Normal range of motion. Cervical back: Normal range of motion. Skin:     General: Skin is warm and dry. Neurological:      General: No focal deficit present. Mental Status: He is alert and oriented to person, place, and time. Psychiatric:         Mood and Affect: Affect is angry. Speech: Speech is rapid and pressured. Behavior: Behavior is uncooperative. Thought Content: Thought content is paranoid and delusional. Thought content does not include homicidal or suicidal ideation. Thought content does not include homicidal or suicidal plan.        Vitals:    Vitals:    03/13/23 1732   BP: 134/77   Pulse: 86   Resp: 16   Temp: 98.8 °F (37.1 °C)   TempSrc: Oral   SpO2: 99%   Weight: 182 lb (82.6 kg)   Height: 5' 9\" (1.753 m)       DIAGNOSTIC RESULTS         LABS:  Labs Reviewed   CBC WITH AUTO DIFFERENTIAL - Abnormal; Notable for the following components:       Result Value    MCH 32.4 (*)     Monocytes % 7.2 (*)     Basophils % 1.3 (*)     Immature Neutrophil % 0.7 (*)     All other components within normal limits   URINE DRUG SCREEN - Abnormal; Notable for the following components:    Cannabinoid Scrn, Ur UNCONFIRMED POSITIVE (*)     Cocaine Metabolite UNCONFIRMED POSITIVE (*)     All other components within normal limits   ETHANOL - Abnormal; Notable for the following components:    Alcohol Scrn 0.23 (*)     All other components within normal limits   ACETAMINOPHEN LEVEL - Abnormal; Notable for the following components:    Acetaminophen Level <5.0 (*)     All other components within normal limits   SALICYLATE LEVEL - Abnormal; Notable for the following components:    Salicylate Lvl <6.1 (*)     All other components within normal limits   URINALYSIS - Abnormal; Notable for the following components:    Color, UA COLORLESS (*)     All other components within normal limits   COVID-19, RAPID   RAPID INFLUENZA A/B ANTIGENS BASIC METABOLIC PANEL       All other labs were within normal range or not returned as of this dictation. MEDICAL DECISION MAKING   Medications - No data to display                                [facility] Assessment  BP: 134/77  Heart Rate: 80                 MDM  This is a 23 y.o. male who presents to the emergency department with reported suicidal ideation and demonstrated activity concerning for suicidal plan. The patient arrives in the emergency department with a pink slip signed by the police department. The patient reportedly sent a video to his girlfriend with a gun pointed at his head. The patient, however, states that he did not send recently and that he recorded it many years ago. He states he no longer owns a gun. The patient says that he was at his house with no particular concerns and the police showed up and insisted that he go to the hospital.  He is unclear why his girlfriend told the police that he was suicidal.  He denies suicidal ideation. He denies a plan to hurt himself. The patient is somewhat agitated with a number of paranoid statements, however, stating that he refuses to talk to me and give me any significant information without his  present. Laboratory evaluation is essentially normal in this patient with the exception of positive test for cocaine and cannabinoids. It is unclear whether or not these represent factors in his current mental state. During discussion, the patient had concerning signs of both paranoia and delusions. He denies suicidal ideation or plan. The veracity of his statement of not sending video to his girlfriend cannot be corroborated at this time. He is under a pink slip from the police department. From an emergency medicine standpoint the patient is medically clear for evaluation by psychiatry and inpatient treatment if deemed appropriate. I was the primary physician of record for this patient.     The patient will be signed out to the oncoming provider for final disposition. Please see their documentation for the final impression and plan on this patient. Clinical Diagnoses Addressed  1. Paranoia ([facility] [address]    2. Suicidal behavior without attempted self-injury              CONSULTS:  IP CONSULT TO PSYCHIATRY    PROCEDURES:  Unless otherwise noted below, none     Procedures      FINAL IMPRESSION      1. Paranoia ([facility] [address]    2. Suicidal behavior without attempted self-injury          DISPOSITION/PLAN   DISPOSITION        PATIENT REFERRED TO:  No follow-up provider specified. DISCHARGE MEDICATIONS:  New Prescriptions    No medications on file     Controlled Substances Monitoring:     No flowsheet data found.     Cesar Mack MD (electronically signed)  Attending Emergency Physician            Cesar Mack MD  03/13/23 9940

## 2023-03-13 NOTE — ED NOTES
Updated patient on needs to change out of clothes into a gown and he became very upset but was easily redirected to have a seat. Patient allows this RN to draw ordered blood specimen and nasal swabs as well as provides a urine specimen at this time. Jen 64  [facility]  03/13/23 1818

## 2023-03-13 NOTE — ED NOTES
1819 Once all labs are clear and pt is medically clear, please CALL Dr Chiara James for psychiatric assessment      Yoel Philip, ABY  03/13/23 9755

## 2023-03-13 NOTE — ED NOTES
Gary Mott provides contact number at this time. Reports he will  patient if he is released. Also requests an update when patient has been dispositioned. Gary Mott # [phone]     Heather B. Fletcher Gottron, RN  03/13/23 2574

## 2023-03-14 VITALS
HEIGHT: 69 IN | WEIGHT: 182 LBS | SYSTOLIC BLOOD PRESSURE: 136 MMHG | TEMPERATURE: 97.6 F | RESPIRATION RATE: 14 BRPM | HEART RATE: 60 BPM | OXYGEN SATURATION: 98 % | DIASTOLIC BLOOD PRESSURE: 72 MMHG | BODY MASS INDEX: 26.96 KG/M2

## 2023-03-14 LAB — ALCOHOL SCREEN SERUM: <0.01 %WT/VOL

## 2023-03-14 PROCEDURE — 96372 THER/PROPH/DIAG INJ SC/IM: CPT

## 2023-03-14 PROCEDURE — 6370000000 HC RX 637 (ALT 250 FOR IP): Performed by: STUDENT IN AN ORGANIZED HEALTH CARE EDUCATION/TRAINING PROGRAM

## 2023-03-14 PROCEDURE — 6370000000 HC RX 637 (ALT 250 FOR IP): Performed by: EMERGENCY MEDICINE

## 2023-03-14 PROCEDURE — 6360000002 HC RX W HCPCS: Performed by: EMERGENCY MEDICINE

## 2023-03-14 PROCEDURE — G0480 DRUG TEST DEF 1-7 CLASSES: HCPCS

## 2023-03-14 RX ORDER — IBUPROFEN 200 MG
TABLET ORAL 4 TIMES DAILY
Status: DISCONTINUED | OUTPATIENT
Start: 2023-03-14 | End: 2023-03-15 | Stop reason: HOSPADM

## 2023-03-14 RX ORDER — LORAZEPAM 2 MG/ML
2 INJECTION INTRAMUSCULAR ONCE
Status: COMPLETED | OUTPATIENT
Start: 2023-03-14 | End: 2023-03-14

## 2023-03-14 RX ORDER — HALOPERIDOL 5 MG/ML
5 INJECTION INTRAMUSCULAR ONCE
Status: COMPLETED | OUTPATIENT
Start: 2023-03-14 | End: 2023-03-14

## 2023-03-14 RX ADMIN — LORAZEPAM 2 MG: 2 INJECTION INTRAMUSCULAR; INTRAVENOUS at 10:55

## 2023-03-14 RX ADMIN — HALOPERIDOL LACTATE 5 MG: 5 INJECTION, SOLUTION INTRAMUSCULAR at 10:55

## 2023-03-14 RX ADMIN — BACITRACIN ZINC, NEOMYCIN SULFATE, POLYMYXIN B SULFATE: 3.5; 5000; 4 OINTMENT TOPICAL at 13:03

## 2023-03-14 RX ADMIN — LACOSAMIDE 200 MG: 50 TABLET, FILM COATED ORAL at 11:00

## 2023-03-14 ASSESSMENT — SLEEP AND FATIGUE QUESTIONNAIRES: AVERAGE NUMBER OF SLEEP HOURS: 7

## 2023-03-14 NOTE — ED NOTES
Pt has uncle at bedside and his mother on the phone. Pt does not want to talk to his mother at this time as they have \"problems\". Pt states he has \"been here for awhile\" and does not want to stay. Pt walks out of room and states \"just take me to long-term\". Security,  and charge RN called to pts room and SOS called. Security and this primary RN, attempting to redirect and deescalate pt. Pt started pacing down the hallway towards zone 2, seen the ems doors and ran. Pt ran into and out both ems doors. Security ran after pt and brought him back in, cuffed and safely to his room.      Mason Aburto RN  03/14/23 5235
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
## 2023-03-14 NOTE — ED NOTES
Pt denies any SI or HI at this time.  This RN offered to order pt breakfast. Pt refused at this time     Maya Trejo, ZAID  03/14/23 5244

## 2023-03-14 NOTE — CONSULTS
Edwraghav Adjutant  [id number]  3/13/2023  03/14/23      ID: Patient is a 23 y.o. male    CC: I was depressed and sent a video to my mom with a gun and that I wanted to complete suicide    HPI:  Pt is a 22 yo  male who presents for exacerbation of depression with suicidal ideations. PT noted recent exacerbation of depression and alcohol intoxication. Pt noted he  was  depressed and sent a video to my mom with a gun and that I wanted to complete suicide. Pt noted he was in a bad place. Pt noted he is doing \"not too good today. \"  Pt noted he is sleeping \"not too good only a few hours last night. \"  Pt noted his apptetite is \"down. \"  Pt rated his depresssion a \"10,\" on a scale of zero to ten with ten being the worst and zero being none. Pt rated his anxiety a \"9,\" on the same scale. Pt denied any thoughts to harm anyone else. Pt noted thoughts to harm himself last night and sent a video to his mom. Pt denied any auditory or visiual hallucintations. Pt denied any hx of seizures, TBIs, Hep C or HIV  No TD noted, AIMS=0,     Pt noted hx of previous inpt psychiatric admissions  Pt denied any previous suicide attempts  Pt denied any family completion of suicide  Pt noted family mental health hx of depression    Pt denied any hx of abuse trauma or neglect, physical sexual or emotional.      Alcohol: alcohol occasionally usually a 6 pack every few days  Street drugs: denies any current  Tobacco: 1 ppd  Caffeine: 2-3 per day      Past Psychiatric History:   See note above     Family Psychiatric History:   No family history on file. Allergies:   Allergies   Allergen Reactions    Naproxen Rash        OBJECTIVE  Vital Signs:  Vitals:    03/14/23 1108   BP: 136/72   Pulse: 60   Resp: 14   Temp: 97.6 °F (36.4 °C)   SpO2: 98%       Labs:  Recent Results (from the past 48 hour(s))   Urine Drug Screen    Collection Time: 03/13/23  5:50 PM   Result Value Ref Range    Cannabinoid Scrn, Ur UNCONFIRMED POSITIVE (A) NEGATIVE    Amphetamines NEGATIVE NEGATIVE    Cocaine Metabolite UNCONFIRMED POSITIVE (A) NEGATIVE    Benzodiazepine Screen, Urine NEGATIVE NEGATIVE    Barbiturate Screen, Ur NEGATIVE NEGATIVE    Opiates, Urine NEGATIVE NEGATIVE    Phencyclidine, Urine NEGATIVE NEGATIVE    Oxycodone NEGATIVE NEGATIVE   Urinalysis    Collection Time: 03/13/23  5:50 PM   Result Value Ref Range    Color, UA COLORLESS (A) YELLOW    Clarity, UA CLEAR CLEAR    Glucose, Urine NEGATIVE NEGATIVE MG/DL    Bilirubin Urine NEGATIVE NEGATIVE MG/DL    Ketones, Urine NEGATIVE NEGATIVE MG/DL    Specific Gravity, UA <1.005 1.001 - 1.035    Blood, Urine NEGATIVE NEGATIVE    pH, Urine 7.0 5.0 - 8.0    Protein, UA NEGATIVE NEGATIVE MG/DL    Urobilinogen, Urine 0.2 0.2 - 1.0 MG/DL    Nitrite Urine, Quantitative NEGATIVE NEGATIVE    Leukocyte Esterase, Urine NEGATIVE NEGATIVE    Urinalysis Comments       Microscopic exam not performed based on chemical results unless requested in original order.    CBC with Auto Differential    Collection Time: 03/13/23  5:52 PM   Result Value Ref Range    WBC 8.9 4.0 - 10.5 K/CU MM    RBC 4.91 4.6 - 6.2 M/CU MM    Hemoglobin 15.9 13.5 - 18.0 GM/DL    Hematocrit 45.9 42 - 52 %    MCV 93.5 78 - 100 FL    MCH 32.4 (H) 27 - 31 PG    MCHC 34.6 32.0 - 36.0 %    RDW 12.3 11.7 - 14.9 %    Platelets 121 254 - 453 K/CU MM    MPV 8.3 7.5 - 11.1 FL    Differential Type AUTOMATED DIFFERENTIAL     Segs Relative 52.3 36 - 66 %    Lymphocytes % 37.7 24 - 44 %    Monocytes % 7.2 (H) 0 - 4 %    Eosinophils % 0.8 0 - 3 %    Basophils % 1.3 (H) 0 - 1 %    Segs Absolute 4.7 K/CU MM    Lymphocytes Absolute 3.4 K/CU MM    Monocytes Absolute 0.6 K/CU MM    Eosinophils Absolute 0.1 K/CU MM    Basophils Absolute 0.1 K/CU MM    Nucleated RBC % 0.0 %    Total Nucleated RBC 0.0 K/CU MM    Total Immature Neutrophil 0.06 K/CU MM    Immature Neutrophil % 0.7 (H) 0 - 0.43 %   BMP    Collection Time: 03/13/23  5:52 PM   Result Value Ref Range Sodium 143 135 - 145 MMOL/L    Potassium 3.8 3.5 - 5.1 MMOL/L    Chloride 103 99 - 110 mMol/L    CO2 26 21 - 32 MMOL/L    Anion Gap 14 4 - 16    BUN 8 6 - 23 MG/DL    Creatinine 0.9 0.9 - 1.3 MG/DL    Est, Glom Filt Rate >60 >60 mL/min/1.73m2    Glucose 95 70 - 99 MG/DL    Calcium 9.6 8.3 - 10.6 MG/DL   ETOH    Collection Time: 03/13/23  5:52 PM   Result Value Ref Range    Alcohol Scrn 0.23 (H) <0.01 %WT/VOL   Acetaminophen Level    Collection Time: 03/13/23  5:52 PM   Result Value Ref Range    Acetaminophen Level <5.0 (L) 15 - 30 ug/ml    DOSE AMOUNT DOSE AMT. GIVEN - UNKNOWN     DOSE TIME DOSE TIME GIVEN - UNKNOWN    Salicylate    Collection Time: 03/13/23  5:52 PM   Result Value Ref Range    Salicylate Lvl <3.9 (L) 15 - 30 MG/DL    DOSE AMOUNT DOSE AMT. GIVEN - UNKNOWN     DOSE TIME DOSE TIME GIVEN - UNKNOWN    Rapid Flu Swab    Collection Time: 03/13/23  5:55 PM    Specimen: Nasopharyngeal   Result Value Ref Range    Rapid Influenza A Ag NEGATIVE NEGATIVE    Rapid Influenza B Ag NEGATIVE NEGATIVE   SPECIMEN REJECTION    Collection Time: 03/13/23  7:02 PM   Result Value Ref Range    Rejected Test COVRB     Reason for Rejection UNABLE TO PERFORM TESTING:    COVID-19, Rapid    Collection Time: 03/13/23  8:02 PM    Specimen: Nasopharyngeal   Result Value Ref Range    Source UNKNOWN     SARS-CoV-2, NAAT NOT DETECTED NOT DETECTED   Ethanol    Collection Time: 03/14/23  3:12 AM   Result Value Ref Range    Alcohol Scrn <0.01 <0.01 %WT/VOL            Allergies:  No Known Allergies     OBJECTIVE  Vital Signs:        Review of Systems:  Reports of no current cardiovascular, respiratory, gastrointestinal, genitourinary, integumentary, neurological, muscuoskeletal, or immunological symptoms today. PSYCHIATRIC: See HPI above. Review of Systems:  Reports of no current cardiovascular, respiratory, gastrointestinal, genitourinary, integumentary, neurological, muscuoskeletal, or immunological symptoms today.    PSYCHIATRIC: See HPI above. Neurologic examination:  Mental status: The patient is alert, attentive, and oriented. Speech is clear and fluent with good repetition, comprehension, and naming. She recalls 3/3 objects at 5 minutes. PSYCHIATRIC EXAMINATION / MENTAL STATUS EXAM         General appearance: [x] appears age, []  appears older than stated age,               [x]  adequately dressed and groomed, [] disheveled,               [x]  in no acute distress, [] appears mildly distressed, [] other           MUSCULOSKELETAL:   Gait:   [] normal, [] antalgic, [] unsteady, [x] gait not evaluated   Station:             [] erect, [] sitting, [x] recumbent, [] other        Strength/tone:  [x] muscle strength and tone appear consistent with age and                                        condition     [] atrophy      [] abnormal movements  PSYCHIATRIC:    Appearance: appears stated age. alert and oriented to person, place, time & situation. no acute distress. Adequate grooming and hygeine. Good eye contact. No prominent physical abnormalities. Attitude: Manner is cooperative and pleasant  Motor: No psychomotor agitation, retardation or abnormal movements noted  Speech: Clearly articulated; normal rate, volume, tone & amount. Language: intact understanding and production  Mood: depressed  Affect: flat  Thought Production: Spontaneous. Thought Form: Coherent, linear, logical & goal-directed. No tangentiality or circumstantiality. No flight of ideas or loosening of associations. Thought Content/Perceptions: Noted SI, No HI, no AVH, no delusion  Insight: questionable  Judgment questionable  Memory: Immediate, recent, and remote appear intact, though not formally tested.   Attention: maintained throughout interview  Fund of knowledge: Average  Gait/Balance: WNL/WNL           Impression:   MDD severe recurrent  Alcohol use D/O    Problem List:   <principal problem not specified>    Pt requires inpt psychiatric admission once medically cleared, pt reqires sitter until transfer. Plan:  1. Reviewed treatment plan with patient including medication risks, benefits, side effects. Obtained informed consent for treatment. 2. Psychiatric management:medication initiation and titration, recommend inpt mental health admission, safe and theraputic environment. 3. Status of problem/condition: ?pending  4. Medical co-morbidities: Management per [facility] group, appreciate assistance  5. Legal Status: involuntary (suicidal)  6. The treatment team reviewed with the patient the diagnosis and treatment recommendations to include the risks, benefits, and side effects of chosen medications. 7. The patient verbalized understanding and agreed with the treatment regimen as outlined above. 8. Medical records, Labs, Diagnotic tests reviewed  9. Interval History. 10. Review current labs  11. Continue current medications  12. Supportive Therapy Provided  13. Pt had an opportunity to ask questions and address concerns  14. Pt encouraged to continue outpt  Therapy. 15. Pt was in agreement with treatment plan. 16. The risks benefits and side effects of medications were discussed with the patient, including alternatives and no treatment.
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
## 2023-03-14 NOTE — ED NOTES
Pt Uncle Meño Rodríguez would like updates at [phone], pt is okay with  receiving updates.      Beatrice Jones RN  03/14/23 3413
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
## 2023-03-14 NOTE — ED NOTES
Pt on bed at this time and not cuffed and agreed to take the ordered medications to help calm him down.       Leonarda Kowalski RN  03/14/23 9991

## 2023-03-14 NOTE — ED NOTES
Pt medicated, and calm in his bed. Pt does appear to have a small abrasion to his right hand.  made aware.            Sharon Brito RN  03/14/23 5128

## 2023-03-14 NOTE — ED PROVIDER NOTES
03/14/23:p.m. Daryl Calloway was checked out to me by Dr. Seble Yancey. Please see his/her initial documentation for details of the patient's ED presentation, physical exam and completed studies. In brief, Daryl Calloway is a 23 y.o. male that presents with complaint of mental health problem awaiting placement.     I have reviewed and interpreted all of the currently available lab results from this visit (if applicable):  Results for orders placed or performed during the hospital encounter of 03/13/23   Rapid Flu Swab    Specimen: Nasopharyngeal   Result Value Ref Range    Rapid Influenza A Ag NEGATIVE NEGATIVE    Rapid Influenza B Ag NEGATIVE NEGATIVE   COVID-19, Rapid    Specimen: Nasopharyngeal   Result Value Ref Range    Source UNKNOWN     SARS-CoV-2, NAAT NOT DETECTED NOT DETECTED   CBC with Auto Differential   Result Value Ref Range    WBC 8.9 4.0 - 10.5 K/CU MM    RBC 4.91 4.6 - 6.2 M/CU MM    Hemoglobin 15.9 13.5 - 18.0 GM/DL    Hematocrit 45.9 42 - 52 %    MCV 93.5 78 - 100 FL    MCH 32.4 (H) 27 - 31 PG    MCHC 34.6 32.0 - 36.0 %    RDW 12.3 11.7 - 14.9 %    Platelets 848 506 - 603 K/CU MM    MPV 8.3 7.5 - 11.1 FL    Differential Type AUTOMATED DIFFERENTIAL     Segs Relative 52.3 36 - 66 %    Lymphocytes % 37.7 24 - 44 %    Monocytes % 7.2 (H) 0 - 4 %    Eosinophils % 0.8 0 - 3 %    Basophils % 1.3 (H) 0 - 1 %    Segs Absolute 4.7 K/CU MM    Lymphocytes Absolute 3.4 K/CU MM    Monocytes Absolute 0.6 K/CU MM    Eosinophils Absolute 0.1 K/CU MM    Basophils Absolute 0.1 K/CU MM    Nucleated RBC % 0.0 %    Total Nucleated RBC 0.0 K/CU MM    Total Immature Neutrophil 0.06 K/CU MM    Immature Neutrophil % 0.7 (H) 0 - 0.43 %   BMP   Result Value Ref Range    Sodium 143 135 - 145 MMOL/L    Potassium 3.8 3.5 - 5.1 MMOL/L    Chloride 103 99 - 110 mMol/L    CO2 26 21 - 32 MMOL/L    Anion Gap 14 4 - 16    BUN 8 6 - 23 MG/DL    Creatinine 0.9 0.9 - 1.3 MG/DL    Est, Glom Filt Rate >60 >60 mL/min/1.73m2    Glucose 95 70 - 99 MG/DL    Calcium 9.6 8.3 - 10.6 MG/DL   Urine Drug Screen   Result Value Ref Range    Cannabinoid Scrn, Ur UNCONFIRMED POSITIVE (A) NEGATIVE    Amphetamines NEGATIVE NEGATIVE    Cocaine Metabolite UNCONFIRMED POSITIVE (A) NEGATIVE    Benzodiazepine Screen, Urine NEGATIVE NEGATIVE    Barbiturate Screen, Ur NEGATIVE NEGATIVE    Opiates, Urine NEGATIVE NEGATIVE    Phencyclidine, Urine NEGATIVE NEGATIVE    Oxycodone NEGATIVE NEGATIVE   ETOH   Result Value Ref Range    Alcohol Scrn 0.23 (H) <0.01 %WT/VOL   Acetaminophen Level   Result Value Ref Range    Acetaminophen Level <5.0 (L) 15 - 30 ug/ml    DOSE AMOUNT DOSE AMT. GIVEN - UNKNOWN     DOSE TIME DOSE TIME GIVEN - UNKNOWN    Salicylate   Result Value Ref Range    Salicylate Lvl <0.3 (L) 15 - 30 MG/DL    DOSE AMOUNT DOSE AMT. GIVEN - UNKNOWN     DOSE TIME DOSE TIME GIVEN - UNKNOWN    Urinalysis   Result Value Ref Range    Color, UA COLORLESS (A) YELLOW    Clarity, UA CLEAR CLEAR    Glucose, Urine NEGATIVE NEGATIVE MG/DL    Bilirubin Urine NEGATIVE NEGATIVE MG/DL    Ketones, Urine NEGATIVE NEGATIVE MG/DL    Specific Gravity, UA <1.005 1.001 - 1.035    Blood, Urine NEGATIVE NEGATIVE    pH, Urine 7.0 5.0 - 8.0    Protein, UA NEGATIVE NEGATIVE MG/DL    Urobilinogen, Urine 0.2 0.2 - 1.0 MG/DL    Nitrite Urine, Quantitative NEGATIVE NEGATIVE    Leukocyte Esterase, Urine NEGATIVE NEGATIVE    Urinalysis Comments       Microscopic exam not performed based on chemical results unless requested in original order.   SPECIMEN REJECTION   Result Value Ref Range    Rejected Test COVRB     Reason for Rejection UNABLE TO PERFORM TESTING:    Ethanol   Result Value Ref Range    Alcohol Scrn <0.01 <0.01 %WT/VOL       MDM:    Patient here with paranoia SI awaiting placement.  Patient seen by mental health accepted at [facility] psychiatry waiting transport    Final Impression:  1. Paranoia (HCC)    2. Suicidal behavior without attempted self-injury        (Please note that portions of  this note may have been completed with a voice recognition program. Efforts were made to edit the dictations but occasionally words are mis-transcribed.)    John Bishop [address], DO  03/14/23 0160

## 2023-03-14 NOTE — ED PROVIDER NOTES
Patient endorsed to me by Dr Christianne Castañeda    24-year-old male who presented to the ED with a history of suicidal ideation. Patient was said to have sent his girlfriend a video of himself with a gun to his head, the girlfriend then called the . Police  brought the patient to the ED, and pink slipped him. Patient continues to deny that he is not suicidal.  Patient uncooperative with staff. Patient currently awaiting psych evaluation.     11:00 PM  Patient care is endorsed to the night team.      EKG (if obtained): (All EKG's are interpreted by myself in the absence of a cardiologist)      Labs (if applicable):  Results for orders placed or performed during the hospital encounter of 03/13/23   Rapid Flu Swab    Specimen: Nasopharyngeal   Result Value Ref Range    Rapid Influenza A Ag NEGATIVE NEGATIVE    Rapid Influenza B Ag NEGATIVE NEGATIVE   CBC with Auto Differential   Result Value Ref Range    WBC 8.9 4.0 - 10.5 K/CU MM    RBC 4.91 4.6 - 6.2 M/CU MM    Hemoglobin 15.9 13.5 - 18.0 GM/DL    Hematocrit 45.9 42 - 52 %    MCV 93.5 78 - 100 FL    MCH 32.4 (H) 27 - 31 PG    MCHC 34.6 32.0 - 36.0 %    RDW 12.3 11.7 - 14.9 %    Platelets 220 563 - 385 K/CU MM    MPV 8.3 7.5 - 11.1 FL    Differential Type AUTOMATED DIFFERENTIAL     Segs Relative 52.3 36 - 66 %    Lymphocytes % 37.7 24 - 44 %    Monocytes % 7.2 (H) 0 - 4 %    Eosinophils % 0.8 0 - 3 %    Basophils % 1.3 (H) 0 - 1 %    Segs Absolute 4.7 K/CU MM    Lymphocytes Absolute 3.4 K/CU MM    Monocytes Absolute 0.6 K/CU MM    Eosinophils Absolute 0.1 K/CU MM    Basophils Absolute 0.1 K/CU MM    Nucleated RBC % 0.0 %    Total Nucleated RBC 0.0 K/CU MM    Total Immature Neutrophil 0.06 K/CU MM    Immature Neutrophil % 0.7 (H) 0 - 0.43 %   BMP   Result Value Ref Range    Sodium 143 135 - 145 MMOL/L    Potassium 3.8 3.5 - 5.1 MMOL/L    Chloride 103 99 - 110 mMol/L    CO2 26 21 - 32 MMOL/L    Anion Gap 14 4 - 16    BUN 8 6 - 23 MG/DL    Creatinine 0.9 0.9 - 1.3 MG/DL Est, Glom Filt Rate >60 >60 mL/min/1.73m2    Glucose 95 70 - 99 MG/DL    Calcium 9.6 8.3 - 10.6 MG/DL   Urine Drug Screen   Result Value Ref Range    Cannabinoid Scrn, Ur UNCONFIRMED POSITIVE (A) NEGATIVE    Amphetamines NEGATIVE NEGATIVE    Cocaine Metabolite UNCONFIRMED POSITIVE (A) NEGATIVE    Benzodiazepine Screen, Urine NEGATIVE NEGATIVE    Barbiturate Screen, Ur NEGATIVE NEGATIVE    Opiates, Urine NEGATIVE NEGATIVE    Phencyclidine, Urine NEGATIVE NEGATIVE    Oxycodone NEGATIVE NEGATIVE   ETOH   Result Value Ref Range    Alcohol Scrn 0.23 (H) <0.01 %WT/VOL   Acetaminophen Level   Result Value Ref Range    Acetaminophen Level <5.0 (L) 15 - 30 ug/ml    DOSE AMOUNT DOSE AMT. GIVEN - UNKNOWN     DOSE TIME DOSE TIME GIVEN - UNKNOWN    Salicylate   Result Value Ref Range    Salicylate Lvl <0.2 (L) 15 - 30 MG/DL    DOSE AMOUNT DOSE AMT. GIVEN - UNKNOWN     DOSE TIME DOSE TIME GIVEN - UNKNOWN    Urinalysis   Result Value Ref Range    Color, UA COLORLESS (A) YELLOW    Clarity, UA CLEAR CLEAR    Glucose, Urine NEGATIVE NEGATIVE MG/DL    Bilirubin Urine NEGATIVE NEGATIVE MG/DL    Ketones, Urine NEGATIVE NEGATIVE MG/DL    Specific Gravity, UA <1.005 1.001 - 1.035    Blood, Urine NEGATIVE NEGATIVE    pH, Urine 7.0 5.0 - 8.0    Protein, UA NEGATIVE NEGATIVE MG/DL    Urobilinogen, Urine 0.2 0.2 - 1.0 MG/DL    Nitrite Urine, Quantitative NEGATIVE NEGATIVE    Leukocyte Esterase, Urine NEGATIVE NEGATIVE    Urinalysis Comments       Microscopic exam not performed based on chemical results unless requested in original order. SPECIMEN REJECTION   Result Value Ref Range    Rejected Test COVRB     Reason for Rejection UNABLE TO PERFORM TESTING:         Radiographs (if obtained):  Radiologist's Report Reviewed:  No results found. Clinical Impression:  1. Paranoia ([facility] [address]    2. Suicidal behavior without attempted self-injury      Disposition referral (if applicable):  No follow-up provider specified.   Disposition medications (if applicable):  New Prescriptions    No medications on file     ED Provider Disposition Time  DISPOSITION        Comment: Please note this report has been produced using speech recognition software and may contain errors related to that system including errors in grammar, punctuation, and spelling, as well as words and phrases that may be inappropriate. Efforts were made to edit the dictations.        [address] [facility],[address], DO  03/14/23 2986

## 2023-03-27 DIAGNOSIS — F41.0 PANIC ATTACKS: ICD-10-CM

## 2023-03-27 DIAGNOSIS — G40.909 SEIZURE DISORDER (HCC): ICD-10-CM

## 2023-03-27 DIAGNOSIS — F51.04 PSYCHOPHYSIOLOGICAL INSOMNIA: ICD-10-CM

## 2023-03-27 DIAGNOSIS — G40.919 BREAKTHROUGH SEIZURE (HCC): ICD-10-CM

## 2023-03-27 DIAGNOSIS — R45.1 RESTLESSNESS AND AGITATION: ICD-10-CM

## 2023-03-27 RX ORDER — TRAZODONE HYDROCHLORIDE 50 MG/1
100 TABLET ORAL NIGHTLY
Qty: 60 TABLET | Refills: 2 | Status: SHIPPED | OUTPATIENT
Start: 2023-03-27

## 2023-03-27 RX ORDER — LACOSAMIDE 200 MG/1
200 TABLET ORAL 2 TIMES DAILY
Qty: 60 TABLET | Refills: 0 | Status: SHIPPED | OUTPATIENT
Start: 2023-03-27 | End: 2023-05-26

## 2023-05-19 ENCOUNTER — OFFICE VISIT (OUTPATIENT)
Dept: FAMILY MEDICINE CLINIC | Age: 23
End: 2023-05-19
Payer: COMMERCIAL

## 2023-05-19 VITALS
SYSTOLIC BLOOD PRESSURE: 148 MMHG | OXYGEN SATURATION: 96 % | BODY MASS INDEX: 27.52 KG/M2 | DIASTOLIC BLOOD PRESSURE: 84 MMHG | HEIGHT: 69 IN | HEART RATE: 78 BPM | WEIGHT: 185.8 LBS

## 2023-05-19 DIAGNOSIS — G40.919 BREAKTHROUGH SEIZURE (HCC): ICD-10-CM

## 2023-05-19 DIAGNOSIS — R45.1 RESTLESSNESS AND AGITATION: ICD-10-CM

## 2023-05-19 DIAGNOSIS — F51.04 PSYCHOPHYSIOLOGICAL INSOMNIA: ICD-10-CM

## 2023-05-19 DIAGNOSIS — G40.909 SEIZURE DISORDER (HCC): ICD-10-CM

## 2023-05-19 DIAGNOSIS — F41.0 PANIC ATTACKS: ICD-10-CM

## 2023-05-19 PROCEDURE — G8427 DOCREV CUR MEDS BY ELIG CLIN: HCPCS | Performed by: PHYSICIAN ASSISTANT

## 2023-05-19 PROCEDURE — 99213 OFFICE O/P EST LOW 20 MIN: CPT | Performed by: PHYSICIAN ASSISTANT

## 2023-05-19 PROCEDURE — 4004F PT TOBACCO SCREEN RCVD TLK: CPT | Performed by: PHYSICIAN ASSISTANT

## 2023-05-19 PROCEDURE — G8419 CALC BMI OUT NRM PARAM NOF/U: HCPCS | Performed by: PHYSICIAN ASSISTANT

## 2023-05-19 RX ORDER — LACOSAMIDE 200 MG/1
200 TABLET ORAL 2 TIMES DAILY
Qty: 60 TABLET | Refills: 0 | Status: SHIPPED | OUTPATIENT
Start: 2023-05-19 | End: 2023-07-18

## 2023-05-19 RX ORDER — TRAZODONE HYDROCHLORIDE 50 MG/1
100 TABLET ORAL NIGHTLY
Qty: 60 TABLET | Refills: 2 | Status: SHIPPED | OUTPATIENT
Start: 2023-05-19

## 2023-05-19 NOTE — PROGRESS NOTES
5/19/2023    [address]    Chief Complaint   Patient presents with    3 Month Follow-Up       HPI  History was obtained from pt. Chico Taylor is a 23 y.o. male with a PMHx as listed below who presents today for 3 month follow up. Pt hasn't had any seizures despite drinking alcohol. Has felt like he was going into a few but hasn't. Been taking his meds. Trying to spend more time with his son, turned [age]. Went to mental hospital after getting pink slipped at the ER. Does have a lot of anxiety when he isn't drinking. He catastrophizes. He feels like he wants to quit drinking, and he can go a week without it, but then he cycles back through it. He has anxiety when he is sober. Should be getting his license back soon    1. Seizure disorder ([address]    2. Breakthrough seizure ([address]    3. Restlessness and agitation    4. Panic attacks    5. Psychophysiological insomnia         REVIEW OF SYMPTOMS    Review of Systems    PAST MEDICAL HISTORY  Past Medical History:   Diagnosis Date    Depression     Seizures ([address]     Suicidal intent        FAMILY HISTORY  No family history on file.     SOCIAL HISTORY  Social History     Socioeconomic History    Marital status: Single   Tobacco Use    Smoking status: Some Days     Packs/day: 1.00     Years: 7.00     Pack years: 7.00     Types: Cigarettes     Start date: 2014    Smokeless tobacco: Never   Vaping Use    Vaping Use: Never used   Substance and Sexual Activity    Alcohol use: Yes     Comment: drinks pints    Drug use: Yes     Types: Marijuana Dipak Eldridge)   Social History Narrative    ** Merged History Encounter **          Social Determinants of Health     Financial Resource Strain: High Risk    Difficulty of Paying Living Expenses: Hard   Food Insecurity: Food Insecurity Present    Worried About Running Out of Food in the Last Year: Sometimes true    Ran Out of Food in the Last Year: Sometimes true   Transportation Needs: Unmet Transportation Needs    Lack of Transportation

## 2023-06-30 DIAGNOSIS — G40.909 SEIZURE DISORDER (HCC): ICD-10-CM

## 2023-06-30 DIAGNOSIS — G40.919 BREAKTHROUGH SEIZURE (HCC): ICD-10-CM

## 2023-07-01 RX ORDER — LACOSAMIDE 200 MG/1
200 TABLET ORAL 2 TIMES DAILY
Qty: 60 TABLET | Refills: 0 | Status: SHIPPED | OUTPATIENT
Start: 2023-07-01 | End: 2023-08-30

## 2023-07-11 ENCOUNTER — TELEMEDICINE (OUTPATIENT)
Dept: FAMILY MEDICINE CLINIC | Age: 23
End: 2023-07-11
Payer: COMMERCIAL

## 2023-07-11 DIAGNOSIS — G40.909 SEIZURE DISORDER (HCC): ICD-10-CM

## 2023-07-11 DIAGNOSIS — G40.919 BREAKTHROUGH SEIZURE (HCC): ICD-10-CM

## 2023-07-11 DIAGNOSIS — F51.01 PRIMARY INSOMNIA: Primary | ICD-10-CM

## 2023-07-11 PROCEDURE — 99214 OFFICE O/P EST MOD 30 MIN: CPT | Performed by: PHYSICIAN ASSISTANT

## 2023-07-11 PROCEDURE — G8428 CUR MEDS NOT DOCUMENT: HCPCS | Performed by: PHYSICIAN ASSISTANT

## 2023-07-11 RX ORDER — AMITRIPTYLINE HYDROCHLORIDE 10 MG/1
10 TABLET, FILM COATED ORAL NIGHTLY
Qty: 30 TABLET | Refills: 0 | Status: SHIPPED | OUTPATIENT
Start: 2023-07-11 | End: 2023-07-11

## 2023-07-11 RX ORDER — AMITRIPTYLINE HYDROCHLORIDE 10 MG/1
10 TABLET, FILM COATED ORAL NIGHTLY
Qty: 30 TABLET | Refills: 2 | Status: SHIPPED | OUTPATIENT
Start: 2023-07-11

## 2023-07-11 NOTE — PROGRESS NOTES
7/11/2023    [address]    Chief Complaint   Patient presents with    Insomnia    Seizures              HPI  History was obtained from pt. Michael Muir is a 20 y.o. male with a PMHx as listed below who presents today for medication changes - seizure med changed to depakote from [address]. The trazodone is causing too much morning after drowsiness. Pt went to [facility] after a seizure - he says they switched his medicine from vimpat to depakote. He hasnt had any issues with it. He had a seizure after drinking alcohol and watching fireworks. He has been staying with his uncle who spoke for him at the ER. According to neuro consult note he was spacing out his Vimpat because of the $1200 per month expense. patient says insurance has been paying for it and he only spaced it out for a few days waiting for refill. He is willing to continue with Depakote, so far there have not been any issues. 1. Primary insomnia    2. Seizure disorder ([facility])    3. Breakthrough seizure ([address])         REVIEW OF SYMPTOMS    Review of Systems    PAST MEDICAL HISTORY  Past Medical History:   Diagnosis Date    Depression     Seizures ([address])     Suicidal intent        FAMILY HISTORY  No family history on file.     SOCIAL HISTORY  Social History     Socioeconomic History    Marital status: Single   Tobacco Use    Smoking status: Some Days     Packs/day: 1.00     Years: 7.00     Pack years: 7.00     Types: Cigarettes     Start date: 2014    Smokeless tobacco: Never   Vaping Use    Vaping Use: Never used   Substance and Sexual Activity    Alcohol use: Yes     Comment: drinks pints    Drug use: Yes     Types: Marijuana Marletta Hoes)   Social History Narrative    ** Merged History Encounter **          Social Determinants of Health     Financial Resource Strain: High Risk    Difficulty of Paying Living Expenses: Hard   Food Insecurity: Food Insecurity Present    Worried About Running Out of Food in the Last Year: Sometimes true    Ran Out of Food in the Last

## 2023-07-20 ENCOUNTER — TELEPHONE (OUTPATIENT)
Dept: FAMILY MEDICINE CLINIC | Age: 23
End: 2023-07-20

## 2023-07-20 DIAGNOSIS — G40.909 SEIZURE DISORDER (HCC): Primary | ICD-10-CM

## 2023-07-20 DIAGNOSIS — G40.919 BREAKTHROUGH SEIZURE (HCC): ICD-10-CM

## 2023-07-20 NOTE — TELEPHONE ENCOUNTER
----- Message from Omar Cortes sent at 7/20/2023  2:13 PM EDT -----  Subject: Referral Request    Reason for referral request? Natacha eDe is calling in from [facility]   and [facility] and is requesting a referral for the patient to be seen for his   seizures.  Can be faxed to [phone]  Provider patient wants to be referred to(if known):     Provider Phone Number(if known):[phone]    Additional Information for Provider?   ---------------------------------------------------------------------------  --------------  [address]    [phone]; OK to leave message on voicemail  ---------------------------------------------------------------------------  --------------

## 2023-07-31 ENCOUNTER — HOSPITAL ENCOUNTER (EMERGENCY)
Age: 23
Discharge: HOME | End: 2023-07-31
Attending: EMERGENCY MEDICINE
Payer: COMMERCIAL

## 2023-07-31 VITALS
TEMPERATURE: 97.8 F | HEIGHT: 71 IN | HEART RATE: 71 BPM | SYSTOLIC BLOOD PRESSURE: 150 MMHG | WEIGHT: 170 LBS | BODY MASS INDEX: 23.8 KG/M2 | OXYGEN SATURATION: 100 % | DIASTOLIC BLOOD PRESSURE: 91 MMHG | RESPIRATION RATE: 14 BRPM

## 2023-07-31 DIAGNOSIS — H66.002 NON-RECURRENT ACUTE SUPPURATIVE OTITIS MEDIA OF LEFT EAR WITHOUT SPONTANEOUS RUPTURE OF TYMPANIC MEMBRANE: Primary | ICD-10-CM

## 2023-07-31 DIAGNOSIS — H60.392 INFECTIVE OTITIS EXTERNA OF LEFT EAR: ICD-10-CM

## 2023-07-31 PROCEDURE — 99283 EMERGENCY DEPT VISIT LOW MDM: CPT

## 2023-07-31 RX ORDER — CIPROFLOXACIN AND DEXAMETHASONE 3; 1 MG/ML; MG/ML
4 SUSPENSION/ DROPS AURICULAR (OTIC) 2 TIMES DAILY
Qty: 7.5 ML | Refills: 0 | Status: SHIPPED | OUTPATIENT
Start: 2023-07-31 | End: 2023-08-07

## 2023-07-31 RX ORDER — AMOXICILLIN 875 MG/1
875 TABLET, COATED ORAL 2 TIMES DAILY
Qty: 20 TABLET | Refills: 0 | Status: SHIPPED | OUTPATIENT
Start: 2023-07-31 | End: 2023-08-10

## 2023-07-31 NOTE — DISCHARGE INSTRUCTIONS
Return immediately to the emergency department if you experience new or worsened symptoms, increased pain, fever, discharge from the ear, or for any other concerns. Do not place anything in the ear other than prescribed medications.

## 2023-07-31 NOTE — ED NOTES
Discharge instructions reviewed with patient. Medications and follow up were discussed.  Patient denies further questions and verbalizes understanding      Bubba Schwab, [employer]  07/31/23 3211

## 2023-08-09 ENCOUNTER — TELEPHONE (OUTPATIENT)
Dept: FAMILY MEDICINE CLINIC | Age: 23
End: 2023-08-09

## 2023-08-09 DIAGNOSIS — G40.909 SEIZURE DISORDER (HCC): Primary | ICD-10-CM

## 2023-08-09 RX ORDER — DIVALPROEX SODIUM 250 MG/1
750 TABLET, DELAYED RELEASE ORAL 2 TIMES DAILY
Qty: 180 TABLET | Refills: 2 | Status: SHIPPED | OUTPATIENT
Start: 2023-08-09 | End: 2023-11-07

## 2023-08-09 NOTE — TELEPHONE ENCOUNTER
DEPAKOTE 250 MG 3 AM AND 3 @ BEDTIME.  -WAS STARTED ON THIS AT HOSP-HAD A REALLY BAD SEIZURE.      IRMA [address] DR

## 2023-08-24 ENCOUNTER — HOSPITAL ENCOUNTER (EMERGENCY)
Age: 23
Discharge: HOME | End: 2023-08-24
Attending: EMERGENCY MEDICINE

## 2023-08-24 VITALS
TEMPERATURE: 98.9 F | OXYGEN SATURATION: 96 % | SYSTOLIC BLOOD PRESSURE: 135 MMHG | RESPIRATION RATE: 16 BRPM | HEIGHT: 70 IN | HEART RATE: 68 BPM | DIASTOLIC BLOOD PRESSURE: 77 MMHG | WEIGHT: 180 LBS | BODY MASS INDEX: 25.77 KG/M2

## 2023-08-24 DIAGNOSIS — H92.03 OTALGIA OF BOTH EARS: Primary | ICD-10-CM

## 2023-08-24 DIAGNOSIS — H61.23 BILATERAL IMPACTED CERUMEN: ICD-10-CM

## 2023-08-24 DIAGNOSIS — H60.393 INFECTIVE OTITIS EXTERNA OF BOTH EARS: ICD-10-CM

## 2023-08-24 PROCEDURE — 6370000000 HC RX 637 (ALT 250 FOR IP): Performed by: EMERGENCY MEDICINE

## 2023-08-24 PROCEDURE — 99283 EMERGENCY DEPT VISIT LOW MDM: CPT

## 2023-08-24 RX ADMIN — CARBAMIDE PEROXIDE 6.5% 10 DROP: 6.5 LIQUID AURICULAR (OTIC) at 17:04

## 2023-08-24 ASSESSMENT — ENCOUNTER SYMPTOMS
ALLERGIC/IMMUNOLOGIC NEGATIVE: 1
EYES NEGATIVE: 1
RESPIRATORY NEGATIVE: 1
GASTROINTESTINAL NEGATIVE: 1

## 2023-08-24 ASSESSMENT — PAIN - FUNCTIONAL ASSESSMENT: PAIN_FUNCTIONAL_ASSESSMENT: NONE - DENIES PAIN

## 2023-08-24 NOTE — ED PROVIDER NOTES
250 MG DR tablet Take 3 tablets by mouth in the morning and at bedtime 180 tablet 2    amitriptyline (ELAVIL) 10 MG tablet Take 1 tablet by mouth nightly 30 tablet 2       Nursing Notes Reviewed    VITAL SIGNS:  ED Triage Vitals   Enc Vitals Group      BP       Pulse       Resp       Temp       Temp src       SpO2       Weight       Height       Head Circumference       Peak Flow       Pain Score       Pain Loc       Pain Edu? Excl. in [address]? PHYSICAL EXAM:  Physical Exam  Vitals and nursing note reviewed. Constitutional:       General: He is not in acute distress. Appearance: Normal appearance. He is well-developed and well-groomed. He is not ill-appearing, toxic-appearing or diaphoretic. HENT:      Head: Normocephalic and atraumatic. Right Ear: Tympanic membrane and external ear normal. Decreased hearing noted. Drainage and swelling present. No tenderness. There is impacted cerumen. No foreign body. Left Ear: External ear normal. Decreased hearing noted. Drainage and swelling present. No tenderness. There is impacted cerumen. No foreign body. Eyes:      Extraocular Movements: Extraocular movements intact. Conjunctiva/sclera: Conjunctivae normal.   Cardiovascular:      Rate and Rhythm: Normal rate and regular rhythm. Pulmonary:      Effort: Pulmonary effort is normal.      Breath sounds: Normal breath sounds. Musculoskeletal:         General: Normal range of motion. Cervical back: Normal range of motion. No rigidity. Skin:     General: Skin is warm. Coloration: Skin is not jaundiced or pale. Findings: No bruising, erythema, lesion or rash. Neurological:      General: No focal deficit present. Mental Status: He is alert and oriented to person, place, and time. Cranial Nerves: No cranial nerve deficit. Sensory: No sensory deficit. Motor: No weakness.       Coordination: Coordination normal.   Psychiatric:         Mood and Affect: Mood normal.         Behavior: Behavior normal.         Thought Content: Thought content normal.         Judgment: Judgment normal.         I have reviewed andinterpreted all of the currently available lab results from this visit (if applicable):    No results found for this visit on 08/24/23. Radiographs (if obtained):  [] The following radiograph was interpreted by myself in the absence of a radiologist:  [x] Radiologist's Report Reviewed:      EKG (if obtained): (All EKG's are interpreted by myself in the absence of a cardiologist)    MDM:    Patient here with complaint of bilateral ear fullness. He was here recently diagnosed with ear infection and finished antibiotic therapy but he did not follow-up because he could not make the co-pay. He states last few days he has been trying to clean his ears but feels like there is something stuck in both sides. He has no pain no fevers nausea vomiting no headache. He appears well on exam he does have bilateral impaction and appears like a yellow substance, could be wax but could be fluid. Will have Debrox and debridement and irrigation and will re eval. patient had Debrox put on irrigation. Right ear does appear better TMs clear however does have some canal swelling. Left ear still has yellow impaction, possibly cerumen possibly infection he has no pain no fevers no headache. He recently had otitis externa I will prescribe him a different antibiotic drops for otitis externa, I will give him Debrox drops as well I do not have all the ENT tools to clean his ears sufficiently I did give him ENT follow-up he is otherwise stable I do not think he needs labs imaging no signs of malignant otitis externa no signs of mastoiditis discharge given return precautions and follow-up information. Discharge.     CLINICAL IMPRESSION:  Final diagnoses:   Otalgia of both ears   Bilateral impacted cerumen   Infective otitis externa of both ears       (Please note that portions of this

## 2023-09-05 ENCOUNTER — OFFICE VISIT (OUTPATIENT)
Dept: FAMILY MEDICINE CLINIC | Age: 23
End: 2023-09-05

## 2023-09-05 VITALS
OXYGEN SATURATION: 99 % | WEIGHT: 191 LBS | DIASTOLIC BLOOD PRESSURE: 60 MMHG | SYSTOLIC BLOOD PRESSURE: 118 MMHG | BODY MASS INDEX: 27.35 KG/M2 | RESPIRATION RATE: 16 BRPM | HEIGHT: 70 IN | HEART RATE: 50 BPM

## 2023-09-05 DIAGNOSIS — G40.909 SEIZURE DISORDER (HCC): ICD-10-CM

## 2023-09-05 DIAGNOSIS — H60.313 ACUTE DIFFUSE OTITIS EXTERNA OF BOTH EARS: Primary | ICD-10-CM

## 2023-09-05 PROCEDURE — 99213 OFFICE O/P EST LOW 20 MIN: CPT | Performed by: PHYSICIAN ASSISTANT

## 2023-09-05 RX ORDER — DIVALPROEX SODIUM 250 MG/1
750 TABLET, DELAYED RELEASE ORAL 2 TIMES DAILY
Qty: 180 TABLET | Refills: 2 | Status: SHIPPED | OUTPATIENT
Start: 2023-09-05 | End: 2023-12-04

## 2023-09-05 SDOH — ECONOMIC STABILITY: FOOD INSECURITY: WITHIN THE PAST 12 MONTHS, YOU WORRIED THAT YOUR FOOD WOULD RUN OUT BEFORE YOU GOT MONEY TO BUY MORE.: SOMETIMES TRUE

## 2023-09-05 SDOH — ECONOMIC STABILITY: HOUSING INSECURITY
IN THE LAST 12 MONTHS, WAS THERE A TIME WHEN YOU DID NOT HAVE A STEADY PLACE TO SLEEP OR SLEPT IN A SHELTER (INCLUDING NOW)?: YES

## 2023-09-05 SDOH — ECONOMIC STABILITY: INCOME INSECURITY: HOW HARD IS IT FOR YOU TO PAY FOR THE VERY BASICS LIKE FOOD, HOUSING, MEDICAL CARE, AND HEATING?: HARD

## 2023-09-05 SDOH — ECONOMIC STABILITY: FOOD INSECURITY: WITHIN THE PAST 12 MONTHS, THE FOOD YOU BOUGHT JUST DIDN'T LAST AND YOU DIDN'T HAVE MONEY TO GET MORE.: SOMETIMES TRUE

## 2023-09-05 NOTE — PROGRESS NOTES
9/5/2023    [facility] [address]     Chief Complaint   Patient presents with    Ear Problem     Presenting for ER follow up visit. Still has some ear drainage at times, yellow in color. Other     Patient would like to discuss possible disability for epilepsy. He would prefer to work, but unable to afford current insurance. Depakote has not had recently due to cost.        HPI  History was obtained from pt. Elaine Kocher is a 20 y.o. male with a PMHx as listed below who presents today for follow up of ear pain, swelling, hearing loss. Has had oral and topica abx from the ER. Condition has improved. Hearing is better, has had a little drainage as well. He also has questions about some type of disability due to his epilepsy, he cant always afford his depakote and he has been off it. He knows he is able to work and wants to work. Needs documentation of epilepsy. 1. Acute diffuse otitis externa of both ears    2. Seizure disorder ([address])             REVIEW OF SYMPTOMS    Review of Systems    PAST MEDICAL HISTORY  Past Medical History:   Diagnosis Date    Depression     Seizures ([address])     Suicidal intent        FAMILY HISTORY  No family history on file.     SOCIAL HISTORY  Social History     Socioeconomic History    Marital status: Single     Spouse name: None    Number of children: None    Years of education: None    Highest education level: None   Tobacco Use    Smoking status: Some Days     Packs/day: 1.00     Years: 7.00     Pack years: 7.00     Types: Cigarettes     Start date: 2014    Smokeless tobacco: Never   Vaping Use    Vaping Use: Never used   Substance and Sexual Activity    Alcohol use: Yes     Comment: drinks pints    Drug use: Yes     Types: Marijuana Michelle Sella)   Social History Narrative    ** Merged History Encounter **          Social Determinants of Health     Food Insecurity: Food Insecurity Present    Worried About Running Out of Food in the Last Year: Sometimes true    Ran Out of Food in the Last Year:

## 2023-10-14 DIAGNOSIS — G40.909 SEIZURE DISORDER (HCC): ICD-10-CM

## 2023-10-16 RX ORDER — DIVALPROEX SODIUM 250 MG/1
750 TABLET, DELAYED RELEASE ORAL 2 TIMES DAILY
Qty: 180 TABLET | Refills: 2 | Status: SHIPPED | OUTPATIENT
Start: 2023-10-16 | End: 2024-01-14

## 2023-11-16 DIAGNOSIS — G40.909 SEIZURE DISORDER (HCC): ICD-10-CM

## 2023-11-16 RX ORDER — DIVALPROEX SODIUM 250 MG/1
750 TABLET, DELAYED RELEASE ORAL 2 TIMES DAILY
Qty: 180 TABLET | Refills: 2 | Status: SHIPPED | OUTPATIENT
Start: 2023-11-16 | End: 2024-02-14

## 2023-12-14 DIAGNOSIS — G40.909 SEIZURE DISORDER (HCC): ICD-10-CM

## 2023-12-15 RX ORDER — DIVALPROEX SODIUM 250 MG/1
750 TABLET, DELAYED RELEASE ORAL 2 TIMES DAILY
Qty: 180 TABLET | Refills: 2 | Status: SHIPPED | OUTPATIENT
Start: 2023-12-15 | End: 2024-03-14

## 2024-01-16 DIAGNOSIS — G40.909 SEIZURE DISORDER (HCC): ICD-10-CM

## 2024-01-16 RX ORDER — DIVALPROEX SODIUM 250 MG/1
750 TABLET, DELAYED RELEASE ORAL 2 TIMES DAILY
Qty: 180 TABLET | Refills: 2 | Status: SHIPPED | OUTPATIENT
Start: 2024-01-16 | End: 2024-04-15

## 2024-04-18 DIAGNOSIS — G40.909 SEIZURE DISORDER (HCC): ICD-10-CM

## 2024-04-19 RX ORDER — DIVALPROEX SODIUM 250 MG/1
750 TABLET, DELAYED RELEASE ORAL 2 TIMES DAILY
Qty: 180 TABLET | Refills: 2 | Status: SHIPPED | OUTPATIENT
Start: 2024-04-19 | End: 2024-07-18

## 2024-05-07 ENCOUNTER — OFFICE VISIT (OUTPATIENT)
Dept: FAMILY MEDICINE CLINIC | Age: 24
End: 2024-05-07
Payer: COMMERCIAL

## 2024-05-07 VITALS
DIASTOLIC BLOOD PRESSURE: 80 MMHG | SYSTOLIC BLOOD PRESSURE: 120 MMHG | HEART RATE: 65 BPM | BODY MASS INDEX: 27.33 KG/M2 | HEIGHT: 70 IN | RESPIRATION RATE: 16 BRPM | WEIGHT: 190.9 LBS | OXYGEN SATURATION: 96 %

## 2024-05-07 DIAGNOSIS — L05.91 PILONIDAL CYST: Primary | ICD-10-CM

## 2024-05-07 DIAGNOSIS — G40.909 SEIZURE DISORDER (HCC): ICD-10-CM

## 2024-05-07 PROCEDURE — G8427 DOCREV CUR MEDS BY ELIG CLIN: HCPCS | Performed by: STUDENT IN AN ORGANIZED HEALTH CARE EDUCATION/TRAINING PROGRAM

## 2024-05-07 PROCEDURE — 4004F PT TOBACCO SCREEN RCVD TLK: CPT | Performed by: STUDENT IN AN ORGANIZED HEALTH CARE EDUCATION/TRAINING PROGRAM

## 2024-05-07 PROCEDURE — 99213 OFFICE O/P EST LOW 20 MIN: CPT | Performed by: STUDENT IN AN ORGANIZED HEALTH CARE EDUCATION/TRAINING PROGRAM

## 2024-05-07 PROCEDURE — G8419 CALC BMI OUT NRM PARAM NOF/U: HCPCS | Performed by: STUDENT IN AN ORGANIZED HEALTH CARE EDUCATION/TRAINING PROGRAM

## 2024-05-07 ASSESSMENT — PATIENT HEALTH QUESTIONNAIRE - PHQ9
9. THOUGHTS THAT YOU WOULD BE BETTER OFF DEAD, OR OF HURTING YOURSELF: NOT AT ALL
SUM OF ALL RESPONSES TO PHQ QUESTIONS 1-9: 18
6. FEELING BAD ABOUT YOURSELF - OR THAT YOU ARE A FAILURE OR HAVE LET YOURSELF OR YOUR FAMILY DOWN: NEARLY EVERY DAY
5. POOR APPETITE OR OVEREATING: NEARLY EVERY DAY
7. TROUBLE CONCENTRATING ON THINGS, SUCH AS READING THE NEWSPAPER OR WATCHING TELEVISION: NEARLY EVERY DAY
9. THOUGHTS THAT YOU WOULD BE BETTER OFF DEAD, OR OF HURTING YOURSELF: NOT AT ALL
5. POOR APPETITE OR OVEREATING: NEARLY EVERY DAY
2. FEELING DOWN, DEPRESSED OR HOPELESS: SEVERAL DAYS
3. TROUBLE FALLING OR STAYING ASLEEP: NEARLY EVERY DAY
SUM OF ALL RESPONSES TO PHQ9 QUESTIONS 1 & 2: 2
2. FEELING DOWN, DEPRESSED OR HOPELESS: SEVERAL DAYS
6. FEELING BAD ABOUT YOURSELF - OR THAT YOU ARE A FAILURE OR HAVE LET YOURSELF OR YOUR FAMILY DOWN: NEARLY EVERY DAY
SUM OF ALL RESPONSES TO PHQ QUESTIONS 1-9: 18
SUM OF ALL RESPONSES TO PHQ9 QUESTIONS 1 & 2: 2
3. TROUBLE FALLING OR STAYING ASLEEP: NEARLY EVERY DAY
10. IF YOU CHECKED OFF ANY PROBLEMS, HOW DIFFICULT HAVE THESE PROBLEMS MADE IT FOR YOU TO DO YOUR WORK, TAKE CARE OF THINGS AT HOME, OR GET ALONG WITH OTHER PEOPLE: VERY DIFFICULT
1. LITTLE INTEREST OR PLEASURE IN DOING THINGS: SEVERAL DAYS
8. MOVING OR SPEAKING SO SLOWLY THAT OTHER PEOPLE COULD HAVE NOTICED. OR THE OPPOSITE - BEING SO FIDGETY OR RESTLESS THAT YOU HAVE BEEN MOVING AROUND A LOT MORE THAN USUAL: SEVERAL DAYS
4. FEELING TIRED OR HAVING LITTLE ENERGY: NEARLY EVERY DAY
SUM OF ALL RESPONSES TO PHQ QUESTIONS 1-9: 18
7. TROUBLE CONCENTRATING ON THINGS, SUCH AS READING THE NEWSPAPER OR WATCHING TELEVISION: NEARLY EVERY DAY
10. IF YOU CHECKED OFF ANY PROBLEMS, HOW DIFFICULT HAVE THESE PROBLEMS MADE IT FOR YOU TO DO YOUR WORK, TAKE CARE OF THINGS AT HOME, OR GET ALONG WITH OTHER PEOPLE: VERY DIFFICULT
4. FEELING TIRED OR HAVING LITTLE ENERGY: NEARLY EVERY DAY
8. MOVING OR SPEAKING SO SLOWLY THAT OTHER PEOPLE COULD HAVE NOTICED. OR THE OPPOSITE, BEING SO FIGETY OR RESTLESS THAT YOU HAVE BEEN MOVING AROUND A LOT MORE THAN USUAL: SEVERAL DAYS
1. LITTLE INTEREST OR PLEASURE IN DOING THINGS: SEVERAL DAYS

## 2024-05-07 ASSESSMENT — ENCOUNTER SYMPTOMS
SHORTNESS OF BREATH: 0
WHEEZING: 0
NAUSEA: 0
SORE THROAT: 0
ABDOMINAL PAIN: 0

## 2024-05-07 NOTE — PROGRESS NOTES
depakote    Return in about 6 months (around 11/7/2024) for sometime before december to establish with new provider.         Electronically signed by Florencio Trammell DO on 5/7/2024

## 2024-08-06 DIAGNOSIS — G40.909 SEIZURE DISORDER (HCC): ICD-10-CM

## 2024-08-07 RX ORDER — DIVALPROEX SODIUM 250 MG/1
750 TABLET, DELAYED RELEASE ORAL 2 TIMES DAILY
Qty: 180 TABLET | Refills: 2 | Status: SHIPPED | OUTPATIENT
Start: 2024-08-07 | End: 2024-11-05

## 2024-08-31 DIAGNOSIS — G40.909 SEIZURE DISORDER (HCC): ICD-10-CM

## 2024-09-03 RX ORDER — DIVALPROEX SODIUM 250 MG/1
750 TABLET, DELAYED RELEASE ORAL 2 TIMES DAILY
Qty: 180 TABLET | Refills: 2 | OUTPATIENT
Start: 2024-09-03 | End: 2024-12-02

## 2024-10-21 DIAGNOSIS — G40.909 SEIZURE DISORDER (HCC): ICD-10-CM

## 2024-10-21 RX ORDER — DIVALPROEX SODIUM 250 MG/1
TABLET, DELAYED RELEASE ORAL
Qty: 240 TABLET | Refills: 2 | Status: SHIPPED | OUTPATIENT
Start: 2024-10-21